# Patient Record
Sex: FEMALE | Race: OTHER | Employment: PART TIME | URBAN - METROPOLITAN AREA
[De-identification: names, ages, dates, MRNs, and addresses within clinical notes are randomized per-mention and may not be internally consistent; named-entity substitution may affect disease eponyms.]

---

## 2017-09-07 ENCOUNTER — OFFICE VISIT (OUTPATIENT)
Dept: FAMILY MEDICINE CLINIC | Facility: CLINIC | Age: 22
End: 2017-09-07

## 2017-09-07 VITALS
DIASTOLIC BLOOD PRESSURE: 88 MMHG | HEART RATE: 106 BPM | WEIGHT: 176 LBS | BODY MASS INDEX: 30.8 KG/M2 | SYSTOLIC BLOOD PRESSURE: 121 MMHG | TEMPERATURE: 98 F | HEIGHT: 63.5 IN

## 2017-09-07 DIAGNOSIS — M79.606 LOW BACK PAIN RADIATING DOWN LEG: ICD-10-CM

## 2017-09-07 DIAGNOSIS — M54.50 LOW BACK PAIN RADIATING DOWN LEG: ICD-10-CM

## 2017-09-07 DIAGNOSIS — M25.561 RIGHT KNEE PAIN, UNSPECIFIED CHRONICITY: Primary | ICD-10-CM

## 2017-09-07 DIAGNOSIS — E66.3 OVERWEIGHT: ICD-10-CM

## 2017-09-07 DIAGNOSIS — Z00.00 ANNUAL PHYSICAL EXAM: ICD-10-CM

## 2017-09-07 LAB
GLUCOSE BLOOD: 83
TEST STRIP LOT #: NORMAL NUMERIC

## 2017-09-07 PROCEDURE — 99203 OFFICE O/P NEW LOW 30 MIN: CPT | Performed by: FAMILY MEDICINE

## 2017-09-07 PROCEDURE — 99212 OFFICE O/P EST SF 10 MIN: CPT | Performed by: FAMILY MEDICINE

## 2017-09-07 PROCEDURE — 36416 COLLJ CAPILLARY BLOOD SPEC: CPT | Performed by: FAMILY MEDICINE

## 2017-09-07 PROCEDURE — 82962 GLUCOSE BLOOD TEST: CPT | Performed by: FAMILY MEDICINE

## 2017-09-07 NOTE — PROGRESS NOTES
HPI:    Patient ID: Jaya Rondon is a 22year old female. Doing well except has recurrent low back pain and right knee pain. Had injury with the soccer years ago. Had pt in the past was helpful.    Also has low back pain          Review of Systems

## 2018-01-05 ENCOUNTER — NURSE TRIAGE (OUTPATIENT)
Dept: OTHER | Age: 23
End: 2018-01-05

## 2018-01-05 NOTE — TELEPHONE ENCOUNTER
Action Requested: Summary for Provider     []  Critical Lab, Recommendations Needed  [] Need Additional Advice  []   FYI    []   Need Orders  [] Need Medications Sent to Pharmacy  []  Other     SUMMARY: Received call from patient who reports for 2 days now

## 2018-07-06 ENCOUNTER — APPOINTMENT (OUTPATIENT)
Dept: GENERAL RADIOLOGY | Facility: HOSPITAL | Age: 23
End: 2018-07-06
Attending: EMERGENCY MEDICINE
Payer: COMMERCIAL

## 2018-07-06 ENCOUNTER — HOSPITAL ENCOUNTER (EMERGENCY)
Facility: HOSPITAL | Age: 23
Discharge: HOME | End: 2018-07-06
Attending: EMERGENCY MEDICINE
Payer: COMMERCIAL

## 2018-07-06 VITALS
DIASTOLIC BLOOD PRESSURE: 84 MMHG | TEMPERATURE: 98 F | OXYGEN SATURATION: 95 % | SYSTOLIC BLOOD PRESSURE: 132 MMHG | HEART RATE: 115 BPM

## 2018-07-06 DIAGNOSIS — S29.012A UPPER BACK STRAIN, INITIAL ENCOUNTER: ICD-10-CM

## 2018-07-06 DIAGNOSIS — S60.211A CONTUSION OF RIGHT WRIST, INITIAL ENCOUNTER: Primary | ICD-10-CM

## 2018-07-06 LAB
B-HCG UR QL: NEGATIVE
BACTERIA UR QL AUTO: NEGATIVE /HPF
BILIRUB UR QL: NEGATIVE
CLARITY UR: CLEAR
COLOR UR: YELLOW
GLUCOSE UR-MCNC: NEGATIVE MG/DL
KETONES UR-MCNC: NEGATIVE MG/DL
NITRITE UR QL STRIP.AUTO: NEGATIVE
PH UR: 7 [PH] (ref 5–8)
PROT UR-MCNC: NEGATIVE MG/DL
RBC #/AREA URNS AUTO: 2 /HPF
SP GR UR STRIP: 1.02 (ref 1–1.03)
UROBILINOGEN UR STRIP-ACNC: <2
VIT C UR-MCNC: NEGATIVE MG/DL
WBC #/AREA URNS AUTO: 1 /HPF

## 2018-07-06 PROCEDURE — 71046 X-RAY EXAM CHEST 2 VIEWS: CPT | Performed by: EMERGENCY MEDICINE

## 2018-07-06 PROCEDURE — 99284 EMERGENCY DEPT VISIT MOD MDM: CPT

## 2018-07-06 PROCEDURE — 81001 URINALYSIS AUTO W/SCOPE: CPT | Performed by: EMERGENCY MEDICINE

## 2018-07-06 PROCEDURE — 81025 URINE PREGNANCY TEST: CPT

## 2018-07-06 PROCEDURE — 73110 X-RAY EXAM OF WRIST: CPT | Performed by: EMERGENCY MEDICINE

## 2018-07-06 RX ORDER — IBUPROFEN 600 MG/1
600 TABLET ORAL ONCE
Status: COMPLETED | OUTPATIENT
Start: 2018-07-06 | End: 2018-07-06

## 2018-07-06 RX ORDER — CYCLOBENZAPRINE HCL 10 MG
10 TABLET ORAL 3 TIMES DAILY PRN
Qty: 15 TABLET | Refills: 0 | Status: SHIPPED | OUTPATIENT
Start: 2018-07-06 | End: 2018-07-11

## 2018-07-06 RX ORDER — NAPROXEN 500 MG/1
500 TABLET ORAL 2 TIMES DAILY PRN
Qty: 14 TABLET | Refills: 0 | Status: SHIPPED | OUTPATIENT
Start: 2018-07-06 | End: 2018-07-13
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
## 2018-08-15 ENCOUNTER — OFFICE VISIT (OUTPATIENT)
Dept: FAMILY MEDICINE CLINIC | Facility: CLINIC | Age: 23
End: 2018-08-15
Payer: COMMERCIAL

## 2018-08-15 VITALS
BODY MASS INDEX: 32 KG/M2 | HEART RATE: 75 BPM | SYSTOLIC BLOOD PRESSURE: 113 MMHG | WEIGHT: 182.5 LBS | DIASTOLIC BLOOD PRESSURE: 75 MMHG

## 2018-08-15 DIAGNOSIS — M54.6 BILATERAL THORACIC BACK PAIN, UNSPECIFIED CHRONICITY: Primary | ICD-10-CM

## 2018-08-15 PROCEDURE — 99212 OFFICE O/P EST SF 10 MIN: CPT | Performed by: FAMILY MEDICINE

## 2018-08-15 PROCEDURE — 99213 OFFICE O/P EST LOW 20 MIN: CPT | Performed by: FAMILY MEDICINE

## 2018-08-15 RX ORDER — NAPROXEN 500 MG/1
500 TABLET ORAL 2 TIMES DAILY WITH MEALS
Qty: 50 TABLET | Refills: 0 | Status: SHIPPED | OUTPATIENT
Start: 2018-08-15 | End: 2018-08-20
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
## 2018-08-20 ENCOUNTER — OFFICE VISIT (OUTPATIENT)
Dept: OBGYN CLINIC | Facility: CLINIC | Age: 23
End: 2018-08-20
Payer: COMMERCIAL

## 2018-08-20 VITALS
HEIGHT: 63 IN | WEIGHT: 181.19 LBS | HEART RATE: 96 BPM | SYSTOLIC BLOOD PRESSURE: 118 MMHG | BODY MASS INDEX: 32.11 KG/M2 | DIASTOLIC BLOOD PRESSURE: 82 MMHG

## 2018-08-20 DIAGNOSIS — Z01.419 ENCOUNTER FOR WELL WOMAN EXAM WITH ROUTINE GYNECOLOGICAL EXAM: Primary | ICD-10-CM

## 2018-08-20 DIAGNOSIS — B96.89 BV (BACTERIAL VAGINOSIS): ICD-10-CM

## 2018-08-20 DIAGNOSIS — Z01.411 ENCOUNTER FOR GYNECOLOGICAL EXAMINATION WITH ABNORMAL FINDING: ICD-10-CM

## 2018-08-20 DIAGNOSIS — N76.0 BV (BACTERIAL VAGINOSIS): ICD-10-CM

## 2018-08-20 DIAGNOSIS — Z11.3 ROUTINE SCREENING FOR STI (SEXUALLY TRANSMITTED INFECTION): ICD-10-CM

## 2018-08-20 DIAGNOSIS — N89.8 VAGINAL DISCHARGE: ICD-10-CM

## 2018-08-20 PROCEDURE — 99385 PREV VISIT NEW AGE 18-39: CPT | Performed by: OBSTETRICS & GYNECOLOGY

## 2018-08-20 RX ORDER — METRONIDAZOLE 7.5 MG/G
1 GEL VAGINAL NIGHTLY
Qty: 1 TUBE | Refills: 0 | Status: SHIPPED | OUTPATIENT
Start: 2018-08-20 | End: 2018-08-25

## 2018-08-21 LAB
C TRACH DNA SPEC QL NAA+PROBE: NEGATIVE
HPV I/H RISK 1 DNA SPEC QL NAA+PROBE: NEGATIVE
N GONORRHOEA DNA SPEC QL NAA+PROBE: NEGATIVE

## 2018-08-22 ENCOUNTER — TELEPHONE (OUTPATIENT)
Dept: OBGYN CLINIC | Facility: CLINIC | Age: 23
End: 2018-08-22

## 2018-08-22 LAB
GENITAL VAGINOSIS SCREEN: POSITIVE
TRICHOMONAS SCREEN: NEGATIVE

## 2019-02-08 ENCOUNTER — TELEPHONE (OUTPATIENT)
Dept: OBGYN CLINIC | Facility: CLINIC | Age: 24
End: 2019-02-08

## 2019-02-08 NOTE — TELEPHONE ENCOUNTER
I last saw the patient last August.  Patient needs to come in for exam and vaginal Culture before just prescribing a medication. Call patient.

## 2019-02-08 NOTE — TELEPHONE ENCOUNTER
Pt. States that the dr prescribed a med. For a yeast infection, which gave the pt. Side effects, so the pt. Wants to know if the dr can prescribe another med. ?

## 2019-02-08 NOTE — TELEPHONE ENCOUNTER
Per pt states that last time she was seen she was prescribed Metrogel for her symptoms. States she had a reaction to the medication and d/c. States symptoms improved but is now noticing a vaginal odor again.  Pt would like to get a different medication that

## 2019-02-09 NOTE — TELEPHONE ENCOUNTER
Pt needs to be seen to be evaluated and have vaginal culture done per Dr. Luis Cannon. Pt was informed and verbalized understanding. Will call back to schedule appt.

## 2019-02-11 ENCOUNTER — OFFICE VISIT (OUTPATIENT)
Dept: OBGYN CLINIC | Facility: CLINIC | Age: 24
End: 2019-02-11
Payer: COMMERCIAL

## 2019-02-11 VITALS — BODY MASS INDEX: 34 KG/M2 | WEIGHT: 191 LBS | DIASTOLIC BLOOD PRESSURE: 60 MMHG | SYSTOLIC BLOOD PRESSURE: 100 MMHG

## 2019-02-11 DIAGNOSIS — N89.8 VAGINAL DISCHARGE: Primary | ICD-10-CM

## 2019-02-11 PROCEDURE — 99213 OFFICE O/P EST LOW 20 MIN: CPT | Performed by: OBSTETRICS & GYNECOLOGY

## 2019-02-11 RX ORDER — METRONIDAZOLE 500 MG/1
500 TABLET ORAL 2 TIMES DAILY
Qty: 14 TABLET | Refills: 0 | Status: SHIPPED | OUTPATIENT
Start: 2019-02-11 | End: 2019-10-18 | Stop reason: ALTCHOICE

## 2019-02-11 NOTE — PROGRESS NOTES
HPI:    Patient ID: Yenny Chang is a 26year old female. Patient reports vaginal discharge with odor for about two weeks now. Very similar to BV episode last August.  Patient reports not finishing Metrogel therapy due to irritation and bleeding.   I s & Referrals:  None       #[id number]

## 2019-02-12 ENCOUNTER — TELEPHONE (OUTPATIENT)
Dept: OBGYN CLINIC | Facility: CLINIC | Age: 24
End: 2019-02-12

## 2019-02-12 NOTE — TELEPHONE ENCOUNTER
LMTCB--      --- Message from Pauly Malcolm MD sent at 2/12/2019  5:41 AM CST -----  Vaginal Culture is + for BV. Patient already given Flagyl. Please notify patient and confirm that she got her prescription and taking meds.

## 2019-02-13 LAB
GENITAL VAGINOSIS SCREEN: POSITIVE
TRICHOMONAS SCREEN: NEGATIVE

## 2019-02-15 NOTE — TELEPHONE ENCOUNTER
Informed pt of message below--    -- Message from Colt Mann MD sent at 2/12/2019  5:41 AM CST -----  Vaginal Culture is + for BV. Patient already given Flagyl.   Please notify patient and confirm that she got her prescription and taking meds.

## 2019-09-16 ENCOUNTER — HOSPITAL ENCOUNTER (EMERGENCY)
Facility: HOSPITAL | Age: 24
Discharge: HOME | End: 2019-09-17
Attending: EMERGENCY MEDICINE
Payer: COMMERCIAL

## 2019-09-16 VITALS
TEMPERATURE: 98 F | RESPIRATION RATE: 18 BRPM | HEART RATE: 90 BPM | SYSTOLIC BLOOD PRESSURE: 124 MMHG | HEIGHT: 62 IN | WEIGHT: 180 LBS | DIASTOLIC BLOOD PRESSURE: 83 MMHG | BODY MASS INDEX: 33.13 KG/M2 | OXYGEN SATURATION: 98 %
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
## 2019-09-16 DIAGNOSIS — S23.9XXA SPRAIN OF THORACIC REGION: Primary | ICD-10-CM

## 2019-09-16 PROCEDURE — 99283 EMERGENCY DEPT VISIT LOW MDM: CPT

## 2019-09-16 RX ORDER — HYDROCODONE BITARTRATE AND ACETAMINOPHEN 5; 325 MG/1; MG/1
1 TABLET ORAL ONCE
Status: COMPLETED | OUTPATIENT
Start: 2019-09-16 | End: 2019-09-16

## 2019-09-16 RX ORDER — HYDROCODONE BITARTRATE AND ACETAMINOPHEN 5; 325 MG/1; MG/1
1 TABLET ORAL EVERY 6 HOURS PRN
Qty: 8 TABLET | Refills: 0 | Status: SHIPPED | OUTPATIENT
Start: 2019-09-16 | End: 2019-10-18 | Stop reason: ALTCHOICE

## 2019-09-16 RX ORDER — IBUPROFEN 600 MG/1
600 TABLET ORAL EVERY 8 HOURS PRN
Qty: 30 TABLET | Refills: 0 | Status: SHIPPED | OUTPATIENT
Start: 2019-09-16 | End: 2019-10-18 | Stop reason: ALTCHOICE

## 2019-09-16 RX ORDER — DIAZEPAM 5 MG/1
5 TABLET ORAL NIGHTLY PRN
Qty: 10 TABLET | Refills: 0 | Status: SHIPPED | OUTPATIENT
Start: 2019-09-16 | End: 2019-10-18 | Stop reason: ALTCHOICE

## 2019-09-16 RX ORDER — DIAZEPAM 5 MG/1
5 TABLET ORAL ONCE
Status: COMPLETED | OUTPATIENT
Start: 2019-09-16 | End: 2019-09-16
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
## 2019-09-20 ENCOUNTER — HOSPITAL ENCOUNTER (OUTPATIENT)
Dept: GENERAL RADIOLOGY | Age: 24
Discharge: HOME | End: 2019-09-20
Attending: NURSE PRACTITIONER
Payer: COMMERCIAL

## 2019-09-20 DIAGNOSIS — S23.9XXA THORACIC SPRAIN: ICD-10-CM

## 2019-09-20 DIAGNOSIS — S13.9XXA CERVICAL SPRAIN, INITIAL ENCOUNTER: ICD-10-CM

## 2019-09-20 PROCEDURE — 72040 X-RAY EXAM NECK SPINE 2-3 VW: CPT | Performed by: NURSE PRACTITIONER

## 2019-09-20 PROCEDURE — 72072 X-RAY EXAM THORAC SPINE 3VWS: CPT | Performed by: NURSE PRACTITIONER

## 2020-09-04 ENCOUNTER — TELEPHONE (OUTPATIENT)
Dept: OBGYN CLINIC | Facility: CLINIC | Age: 25
End: 2020-09-04

## 2020-09-04 NOTE — TELEPHONE ENCOUNTER
Iris pt last seen 02/11/19 by NASRA reported this is first time she gets a period 2 weeks apart. On   Wednesday felt abdominal pain followed by heavy bleeding.  Was saturating pads every 2 hours and passing clots, but today bleedinghas tapperd off to changing

## 2020-09-04 NOTE — TELEPHONE ENCOUNTER
Pt. States that she had her period about 2 weeks ago, and this past Wed. Pt. [place] that she had sharp pains in her stomach and period started all over again. Pt. Is not sure why she is getting her period so soon and It is very heavy?

## 2020-10-10 ENCOUNTER — APPOINTMENT (OUTPATIENT)
Dept: GENERAL RADIOLOGY | Facility: HOSPITAL | Age: 25
End: 2020-10-10
Attending: EMERGENCY MEDICINE
Payer: COMMERCIAL

## 2020-10-10 ENCOUNTER — HOSPITAL ENCOUNTER (EMERGENCY)
Facility: HOSPITAL | Age: 25
Discharge: HOME | End: 2020-10-10
Attending: EMERGENCY MEDICINE
Payer: COMMERCIAL

## 2020-10-10 VITALS
RESPIRATION RATE: 18 BRPM | HEART RATE: 95 BPM | SYSTOLIC BLOOD PRESSURE: 124 MMHG | BODY MASS INDEX: 33.13 KG/M2 | HEIGHT: 62 IN | DIASTOLIC BLOOD PRESSURE: 85 MMHG | TEMPERATURE: 98 F | OXYGEN SATURATION: 99 % | WEIGHT: 180 LBS

## 2020-10-10 DIAGNOSIS — S89.90XA KNEE INJURY, UNSPECIFIED LATERALITY, INITIAL ENCOUNTER: Primary | ICD-10-CM

## 2020-10-10 PROCEDURE — 73560 X-RAY EXAM OF KNEE 1 OR 2: CPT | Performed by: EMERGENCY MEDICINE

## 2020-10-10 PROCEDURE — 99284 EMERGENCY DEPT VISIT MOD MDM: CPT

## 2020-10-10 RX ORDER — MELOXICAM 7.5 MG/1
7.5 TABLET ORAL DAILY
Qty: 14 TABLET | Refills: 0 | Status: SHIPPED | OUTPATIENT
Start: 2020-10-10 | End: 2020-10-24

## 2020-10-10 RX ORDER — TRAMADOL HYDROCHLORIDE 50 MG/1
50 TABLET ORAL EVERY 12 HOURS PRN
Qty: 6 TABLET | Refills: 0 | Status: SHIPPED | OUTPATIENT
Start: 2020-10-10 | End: 2020-10-13
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
## 2020-10-11 NOTE — ED NOTES
At time of discharge, Mandi Hudson is aaox4, speech clear, respirations regular and nonlabored. She is assisted to wheelchair for ED exit.  Ami verbalizes understanding of discharge instructions including prescribed medications and follow up recommendation
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
## 2020-10-14 ENCOUNTER — APPOINTMENT (OUTPATIENT)
Dept: CT IMAGING | Facility: HOSPITAL | Age: 25
End: 2020-10-14
Attending: NURSE PRACTITIONER
Payer: COMMERCIAL

## 2020-10-14 ENCOUNTER — HOSPITAL ENCOUNTER (EMERGENCY)
Facility: HOSPITAL | Age: 25
Discharge: HOME | End: 2020-10-14
Payer: COMMERCIAL

## 2020-10-14 VITALS
SYSTOLIC BLOOD PRESSURE: 111 MMHG | DIASTOLIC BLOOD PRESSURE: 80 MMHG | HEART RATE: 90 BPM | WEIGHT: 180 LBS | OXYGEN SATURATION: 99 % | RESPIRATION RATE: 18 BRPM | HEIGHT: 62 IN | TEMPERATURE: 98 F | BODY MASS INDEX: 33.13 KG/M2

## 2020-10-14 DIAGNOSIS — M25.561 ACUTE BILATERAL KNEE PAIN: Primary | ICD-10-CM

## 2020-10-14 DIAGNOSIS — M25.562 ACUTE BILATERAL KNEE PAIN: Primary | ICD-10-CM

## 2020-10-14 PROCEDURE — 73700 CT LOWER EXTREMITY W/O DYE: CPT | Performed by: NURSE PRACTITIONER

## 2020-10-14 PROCEDURE — 99284 EMERGENCY DEPT VISIT MOD MDM: CPT

## 2020-10-14 PROCEDURE — 81025 URINE PREGNANCY TEST: CPT

## 2020-10-14 RX ORDER — HYDROCODONE BITARTRATE AND ACETAMINOPHEN 5; 325 MG/1; MG/1
1 TABLET ORAL ONCE
Status: COMPLETED | OUTPATIENT
Start: 2020-10-14 | End: 2020-10-14

## 2020-10-14 RX ORDER — ACETAMINOPHEN AND CODEINE PHOSPHATE 300; 30 MG/1; MG/1
1-2 TABLET ORAL EVERY 6 HOURS PRN
Qty: 10 TABLET | Refills: 0 | Status: SHIPPED | OUTPATIENT
Start: 2020-10-14 | End: 2020-10-16

## 2020-10-14 NOTE — ED PROVIDER NOTES
Patient Seen in: [facility] AND [facility] Emergency Department      History   Patient presents with:  Knee Pain  Leg Pain    Stated Complaint: knee pain    26yo/f with no chronic medical problems reports to the ED with complaints of worsening bilateral knee cade Breath sounds: Normal breath sounds. Abdominal:      General: Bowel sounds are normal.      Palpations: Abdomen is soft. Musculoskeletal: Normal range of motion. General: Swelling and tenderness present. No deformity.       Comments: Neg homans, recommended.            Dictated by (CST): Ryan Castro MD on 10/14/2020 at 8:15 PM       Finalized by (CST): Ryan Castro MD on 10/14/2020 at 8:20 PM                 TECHNIQUE:    Multi-planar CT images of the left knee were obtained without intravenou for possible acl tear left side  Otherwise no acute injury  Compartments soft  No ecchymosis    Plan  Outpatient mri, close fu with ortho/Dr. Yumiko Gilman patient on home care, ss of worsening condition, reasons for immediate re-eval, importance of c
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
## 2020-10-15 ENCOUNTER — HOSPITAL ENCOUNTER (OUTPATIENT)
Dept: MRI IMAGING | Facility: HOSPITAL | Age: 25
Discharge: HOME | End: 2020-10-15
Attending: NURSE PRACTITIONER
Payer: COMMERCIAL

## 2020-10-15 DIAGNOSIS — S89.92XA KNEE INJURY, LEFT, INITIAL ENCOUNTER: ICD-10-CM

## 2020-10-15 DIAGNOSIS — M25.562 LEFT KNEE PAIN, UNSPECIFIED CHRONICITY: ICD-10-CM

## 2020-10-15 PROCEDURE — 73721 MRI JNT OF LWR EXTRE W/O DYE: CPT | Performed by: NURSE PRACTITIONER

## 2020-11-19 NOTE — PROGRESS NOTES
HPI:    Patient ID: Ankit Hurtado is a 25year old female. HPI  Patient here for routine exam.  New to office. Reports vaginal discharge with odor. Patient is sexually active and admits to having intercourse without condom use at least once.   Discusse Open Wound Care     for ___ARM___________        ? No strenuous activity for 48 hours    ? Take Tylenol as needed for discomfort.                                                .         ? Do not drink alcoholic beverages for 48 hours.    ? Keep the pressure bandage in place for 24 hours. If the bandage becomes blood tinged or loose, reinforce it with gauze and tape.        (Refer to the reverse side of this page for management of bleeding).    ? Remove bandage in 24 hours and begin wound care as follows:     1. Clean area with tap water using a Q tip or gauze pad, (shower / bathe normally)  2. Dry wound with Q tip or gauze pad  3. Apply Aquaphor, Vaseline, Polysporin or Bacitracin Ointment with a Q tip    Do NOT use Neosporin Ointment *  4. Cover the wound with a band-aid or nonstick gauze pad and paper tape.  5. Repeat wound care once a day until wound is completely healed.    It is an old wives tale that a wound heals better when it is exposed to air and allowed to dry out. The wound will heal faster with a better cosmetic result if it is kept moist with ointment and covered with a bandage.  Do not let the wound dry out.      Supplies Needed:                Qtips or gauze pads                Polysporin or Bacitracin Ointment                Bandaids or nonstick gauze pads and paper tape    Wound care kits and brown paper tape are available for purchase at   the pharmacy.    BLEEDIN. Use tightly rolled up gauze or cloth to apply direct pressure over the bandage for 20   minutes.  2. Reapply pressure for an additional 20 minutes if necessary  3. Call the office or go to the nearest emergency room if pressure fails to stop the bleeding.  4. Use additional gauze and tape to maintain pressure once the bleeding has stopped.  5. Begin wound care 24 hours after surgery as directed.                  WOUND HEALING    1. One week after surgery a pink / red halo will form around the outside of the wound.   This is new  reviewed.              ASSESSMENT/PLAN:   Encounter for well woman exam with routine gynecological exam  (primary encounter diagnosis)  Routine screening for sti (sexually transmitted infection)  Vaginal discharge  Encounter for gynecological examination wi skin.  2. The center of the wound will appear yellowish white and produce some drainage.  3. The pink halo will slowly migrate in toward the center of the wound until the wound is covered with new shiny pink skin.  4. There will be no more drainage when the wound is completely healed.  5. It will take six months to one year for the redness to fade.  6. The scar may be itchy, tight and sensitive to extreme temperatures for a year after the surgery.  7. Massaging the area several times a day for several minutes after the wound is completely healed will help the scar soften and normalize faster. Begin massage only after healing is complete.      In case of emergency call: Dr Dutta: [phone]     [facility]: [phone]    [facility]: [phone]

## 2021-04-09 ENCOUNTER — HOSPITAL ENCOUNTER (OUTPATIENT)
Age: 26
Discharge: HOME | End: 2021-04-09
Payer: COMMERCIAL

## 2021-04-09 VITALS
OXYGEN SATURATION: 100 % | HEART RATE: 96 BPM | HEIGHT: 62 IN | BODY MASS INDEX: 31.28 KG/M2 | TEMPERATURE: 98 F | RESPIRATION RATE: 16 BRPM | SYSTOLIC BLOOD PRESSURE: 115 MMHG | DIASTOLIC BLOOD PRESSURE: 60 MMHG | WEIGHT: 170 LBS

## 2021-04-09 DIAGNOSIS — R19.7 DIARRHEA, UNSPECIFIED TYPE: Primary | ICD-10-CM

## 2021-04-09 PROCEDURE — 99203 OFFICE O/P NEW LOW 30 MIN: CPT | Performed by: NURSE PRACTITIONER

## 2021-04-09 PROCEDURE — 81025 URINE PREGNANCY TEST: CPT | Performed by: NURSE PRACTITIONER

## 2021-04-09 PROCEDURE — 81002 URINALYSIS NONAUTO W/O SCOPE: CPT | Performed by: NURSE PRACTITIONER

## 2021-04-09 PROCEDURE — 80047 BASIC METABLC PNL IONIZED CA: CPT | Performed by: NURSE PRACTITIONER

## 2021-04-09 PROCEDURE — 36415 COLL VENOUS BLD VENIPUNCTURE: CPT | Performed by: NURSE PRACTITIONER

## 2021-04-09 PROCEDURE — 85025 COMPLETE CBC W/AUTO DIFF WBC: CPT | Performed by: NURSE PRACTITIONER

## 2021-04-09 RX ORDER — DICYCLOMINE HCL 20 MG
20 TABLET ORAL 4 TIMES DAILY PRN
Qty: 20 TABLET | Refills: 0 | Status: SHIPPED | OUTPATIENT
Start: 2021-04-09 | End: 2021-08-26 | Stop reason: ALTCHOICE

## 2021-04-09 RX ORDER — IBUPROFEN 200 MG
200 TABLET ORAL EVERY 6 HOURS PRN
COMMUNITY

## 2021-04-09 RX ORDER — ONDANSETRON 4 MG/1
4 TABLET, ORALLY DISINTEGRATING ORAL EVERY 4 HOURS PRN
Qty: 10 TABLET | Refills: 0 | Status: SHIPPED | OUTPATIENT
Start: 2021-04-09 | End: 2021-04-16

## 2021-04-10 NOTE — ED INITIAL ASSESSMENT (HPI)
Pt c/o generalized abd pain and soft stool x5 days. States diarrhea x1 day, first day of symptoms. No N/V. States 8 episodes of soft stool in past 24 hrs. No blood in stool. No fever. No urinary symptoms.

## 2021-04-10 NOTE — ED PROVIDER NOTES
Patient Seen in: [facility]      History   Patient presents with:  Diarrhea: Upset stomach for 4 days. - Entered by patient  Abdominal Pain    Stated Complaint: Diarrhea - Upset stomach for 4 days.     HPI/Subjective:   HPI    26year-old fem distress. Appearance: She is well-developed. She is obese. She is not ill-appearing. HENT:      Head: Normocephalic. Eyes:      Conjunctiva/sclera: Conjunctivae normal.   Cardiovascular:      Rate and Rhythm: Normal rate and regular rhythm.       He that if pain localizes or does not improve that she will need to be seen in the ER for further evaluation and scan           Clinical impression as well as any lab results and radiology findings were discussed with the patient.  Patient is advised to follow

## 2021-08-26 PROBLEM — S83.512A RUPTURE OF ANTERIOR CRUCIATE LIGAMENT OF BOTH KNEES: Status: ACTIVE | Noted: 2021-08-26

## 2021-08-26 PROBLEM — S83.209A ACUTE TORN MENISCUS: Status: ACTIVE | Noted: 2021-08-26

## 2021-08-26 PROBLEM — S83.511A RUPTURE OF ANTERIOR CRUCIATE LIGAMENT OF BOTH KNEES: Status: ACTIVE | Noted: 2021-08-26

## 2021-09-29 ENCOUNTER — OFFICE VISIT (OUTPATIENT)
Dept: FAMILY MEDICINE CLINIC | Facility: CLINIC | Age: 26
End: 2021-09-29
Payer: COMMERCIAL

## 2021-09-29 VITALS
WEIGHT: 189 LBS | HEART RATE: 88 BPM | HEIGHT: 62 IN | OXYGEN SATURATION: 98 % | DIASTOLIC BLOOD PRESSURE: 75 MMHG | SYSTOLIC BLOOD PRESSURE: 129 MMHG | TEMPERATURE: 98 F | RESPIRATION RATE: 18 BRPM | BODY MASS INDEX: 34.78 KG/M2

## 2021-09-29 DIAGNOSIS — Z20.822 ENCOUNTER FOR SCREENING LABORATORY TESTING FOR COVID-19 VIRUS IN ASYMPTOMATIC PATIENT: Primary | ICD-10-CM

## 2021-09-29 PROCEDURE — 3008F BODY MASS INDEX DOCD: CPT | Performed by: NURSE PRACTITIONER

## 2021-09-29 PROCEDURE — 3078F DIAST BP <80 MM HG: CPT | Performed by: NURSE PRACTITIONER

## 2021-09-29 PROCEDURE — 3074F SYST BP LT 130 MM HG: CPT | Performed by: NURSE PRACTITIONER

## 2021-09-29 PROCEDURE — 99212 OFFICE O/P EST SF 10 MIN: CPT | Performed by: NURSE PRACTITIONER

## 2021-09-29 NOTE — PROGRESS NOTES
CHIEF COMPLAINT:   Patient presents with:  Wellness Visit: Need covid test for work - Entered by patient      HPI:   Sarah Hung is a 26year old female who presents for Covid 19 test. No exposure. Needs for work. .  At this time, not experiencing upper r bulging, no retraction, no fluid, bony landmarks visualized  NOSE: Nostrils patent, no nasal discharge, nasal mucosa pink  THROAT: Oral mucosa pink, moist. Posterior pharynx is not erythematous. no exudates. Tonsils 0/4.     NECK: Supple, non-tender  LUNGS: you get the 2-dose vaccine, the second dose is given several weeks after the first. You are considered fully vaccinated 2 weeks after getting the 1-dose or the second shot of the 2-dose vaccine.   During a pandemic, it's especially important to keep up on r water, use an alcohol-based hand  often. Make sure it has at least 60% alcohol. · Don't touch your eyes, nose, or mouth unless you have clean hands. · Don’t kiss someone who is sick. · If you need to cough or sneeze, do it into a tissue.  Then t throw the tissue into the trash. If you don't have tissues, cough or sneeze into the bend of your elbow. · Don't attend public gatherings if possible. If you do attend public gatherings, follow social distancing rules.  Don't share food or personal items s in any way. · Tell your supervisor if you are well but live with someone who has COVID-19. · Stay at least 6 feet away from all people. · Don't shake hands with anyone. · Don't attend in-person meetings, or limit how many you attend.  Meet over phone or it:   · Call your healthcare provider and follow all instructions. Stay home away from others and monitor your health. This is called quarantine.  The CDC advises that you quarantine to help prevent the spread of COVID-19 once you've been exposed to someone fever, cough, and trouble breathing. They may also include body aches, headache, chills or repeated shaking with chills, sore throat, loss of taste or smell, or diarrhea. Don’t go to a healthcare facility before speaking to a healthcare provider.   Last mod

## 2021-09-30 LAB — SARS-COV-2 RNA RESP QL NAA+PROBE: NOT DETECTED

## 2021-10-07 ENCOUNTER — OFFICE VISIT (OUTPATIENT)
Dept: FAMILY MEDICINE CLINIC | Facility: CLINIC | Age: 26
End: 2021-10-07
Payer: COMMERCIAL

## 2021-10-07 VITALS
HEART RATE: 94 BPM | OXYGEN SATURATION: 98 % | RESPIRATION RATE: 16 BRPM | HEIGHT: 62 IN | DIASTOLIC BLOOD PRESSURE: 75 MMHG | BODY MASS INDEX: 34.78 KG/M2 | WEIGHT: 189 LBS | SYSTOLIC BLOOD PRESSURE: 102 MMHG | TEMPERATURE: 98 F

## 2021-10-07 DIAGNOSIS — Z11.52 ENCOUNTER FOR SCREENING FOR COVID-19: Primary | ICD-10-CM

## 2021-10-07 PROCEDURE — 99212 OFFICE O/P EST SF 10 MIN: CPT | Performed by: NURSE PRACTITIONER

## 2021-10-07 PROCEDURE — 3078F DIAST BP <80 MM HG: CPT | Performed by: NURSE PRACTITIONER

## 2021-10-07 PROCEDURE — 3074F SYST BP LT 130 MM HG: CPT | Performed by: NURSE PRACTITIONER

## 2021-10-07 PROCEDURE — 3008F BODY MASS INDEX DOCD: CPT | Performed by: NURSE PRACTITIONER

## 2021-10-07 NOTE — PROGRESS NOTES
CHIEF COMPLAINT:   Patient presents with:  Covid-19 Test      HPI:   Bhavesh Martínez is a 26year old female who presents for Covid 19 testing. Denies any symptoms or exposure but her job is requiring it weekly due to her not being COVID-vaccinated.   At [facility] PLAN:   Leonoar Kwok is a 26year old female who presents with     ASSESSMENT: Encounter for screening for covid-19  (primary encounter diagnosis)    PLAN:   RT PCR - [employer] ordered, needs for work requirement    Continue COVID-preventative measures.    who have long-term (chronic) health conditions. Getting a yearly flu vaccine is advised for everyone 7 months old and older, with rare exceptions. Health experts advise the flu vaccine to protect you and others.  The flu vaccine helps protect those at high- and handles, cabinet handles, and light switches. · Clean frequently-touched home surfaces often with disinfectant. This includes desk surfaces, printers, phones, kitchen counters, tables, fridge door handle, bathroom surfaces, and any soiled surface.  Isis disposable paper mask with a cloth mask over it. You can make a cloth face mask of your own. The [facility] has instructions on how to make a mask. Wear the mask so that it covers both your nose and mouth. · Be aware of your community's mask requirements.  Debora Johnson seconds. · If you don't have access to soap and water, use an alcohol-based hand  often. Make sure it has at least 60% alcohol. · Don't touch your eyes, nose, or mouth unless you have clean hands.   · Wear a face mask as advised by your employer, for how long quarantine should last. If you have been exposed but don't have symptoms, you can stop quarantine:  ? 10 days after exposure if you don't get a diagnostic (viral) test, or  ? 7 days after getting a negative viral test result.     · Take your t a substitute for professional medical care. Always follow your healthcare professional's instructions. The patient indicates understanding of these issues and agrees to the plan.

## 2021-10-13 ENCOUNTER — OFFICE VISIT (OUTPATIENT)
Dept: FAMILY MEDICINE CLINIC | Facility: CLINIC | Age: 26
End: 2021-10-13
Payer: COMMERCIAL

## 2021-10-13 DIAGNOSIS — Z11.59 SCREENING FOR VIRAL DISEASE: Primary | ICD-10-CM

## 2021-10-13 PROCEDURE — 99212 OFFICE O/P EST SF 10 MIN: CPT | Performed by: PHYSICIAN ASSISTANT

## 2021-10-13 PROCEDURE — 3008F BODY MASS INDEX DOCD: CPT | Performed by: PHYSICIAN ASSISTANT

## 2021-10-14 VITALS
HEIGHT: 62 IN | HEART RATE: 77 BPM | RESPIRATION RATE: 16 BRPM | TEMPERATURE: 98 F | BODY MASS INDEX: 33.13 KG/M2 | WEIGHT: 180 LBS | OXYGEN SATURATION: 97 %

## 2021-10-15 NOTE — PROGRESS NOTES
CHIEF COMPLAINT:   Patient presents with:  Covid-19 Test      HPI:   Leonora Kwok is a 23year old female who presents for Covid testing. Patient needs COVID testing for work.   Patient denies any known exposure to COVID,  Patient denies any symptoms of C retraction, no fluid, bony landmarks intact. EACs WNL BL. NOSE: Nostrils patent, no nasal discharge, nasal mucosa pink   THROAT: oral mucosa pink, moist. Posterior pharynx not erythematous or injected. No exudates.   No uvular deviation, drooling, muffl recommendations. If you test positive for COVID-19, you should notify your family and friends with whom you have had close contact recently (starting 2 days before you first had symptoms). What counts as close contact?     * You were within 6 feet of s places. If you must go out, avoid using any kind of public transportation, ridesharing, or taxis. 2. Monitor your symptoms carefully. If your symptoms get worse, call your healthcare provider immediately. 3. Get rest and stay hydrated.    4. If you have following the below guidelines:  • At least 24 hours have passed since recovery defined as resolution of fever without the use of fever-reducing medications; and  · Improvement in respiratory symptoms (e.g., cough, shortness of breath); and  · At least 10 donate convalescent plasma? The process for donating plasma is very similar to donating blood. [employer] (a large blood research institute in [address] [facility] and one of [facility]’s blood product suppliers) is coordinating plasma donations.     If you wou following symptoms:    Persistent severe fatigue Brain fog or trouble concentrating   Headaches Sleeping difficulties   Anxiety or depression Loss of taste or smell   Chest pain  Palpitations Light headedness  Muscle Pain   Increased Heart Rate Tingling, n

## 2021-10-15 NOTE — PATIENT INSTRUCTIONS
Coronavirus Disease 2019 (COVID-19)     [facility] is committed to the safety and well-being of our patients, members, employees, and communities.  As concerns arise about the new strain of coronavirus that causes COVID-19, [facility] exposure  • After day 7 from date of last exposure with a negative test result (test must occur on day 5 or later)  After stopping quarantine, you should  • Watch for symptoms until 14 days after exposure.   • If you have symptoms, immediately self-isolate Care     If you are awaiting test results or are confirmed positive for COVID -19, and your symptoms worsen at home with symptoms such as: extreme weakness, difficult breathing, or unrelenting fevers greater than 100.4 degrees Fahrenheit, you should contac Follow-up  If you are diagnosed with COVID, refrain from exercise until approved by your primary care provider. Please call your primary care provider within 2 days of your discharge to arrange for a telehealth follow-up.  CDC does not recommend repeat test Control & Prevention (CDC)  10 things you can do to manage your health at home, [email or web address]. pdf  [email or web address] Retrieved March 17, 2021, from [email or web address]/covid-19-information/covid-19-long-haulers. html  Long-term effects of covid-19. (n.d.).  Retrieved May 11, 2021, from [email or web address]

## 2021-10-21 ENCOUNTER — OFFICE VISIT (OUTPATIENT)
Dept: FAMILY MEDICINE CLINIC | Facility: CLINIC | Age: 26
End: 2021-10-21
Payer: COMMERCIAL

## 2021-10-21 VITALS
DIASTOLIC BLOOD PRESSURE: 79 MMHG | HEART RATE: 81 BPM | SYSTOLIC BLOOD PRESSURE: 137 MMHG | BODY MASS INDEX: 33.13 KG/M2 | HEIGHT: 62 IN | WEIGHT: 180 LBS | TEMPERATURE: 99 F | OXYGEN SATURATION: 100 % | RESPIRATION RATE: 15 BRPM

## 2021-10-21 DIAGNOSIS — Z11.52 ENCOUNTER FOR SCREENING FOR COVID-19: Primary | ICD-10-CM

## 2021-10-21 PROCEDURE — 99212 OFFICE O/P EST SF 10 MIN: CPT | Performed by: NURSE PRACTITIONER

## 2021-10-21 PROCEDURE — 3008F BODY MASS INDEX DOCD: CPT | Performed by: NURSE PRACTITIONER

## 2021-10-21 PROCEDURE — 3078F DIAST BP <80 MM HG: CPT | Performed by: NURSE PRACTITIONER

## 2021-10-21 PROCEDURE — 3075F SYST BP GE 130 - 139MM HG: CPT | Performed by: NURSE PRACTITIONER

## 2021-10-22 NOTE — PROGRESS NOTES
CHIEF COMPLAINT:   Patient presents with:  Covid-19 Test      HPI:   Ankit Hurtado is a 23year old female who presents for Covid 19 testing. Denies any symptoms or exposure but her job is requiring it weekly due to her not being COVID-vaccinated.   Denies Breathing is non labored. Cough- absent.   CARDIO: RRR without murmur    ASSESSMENT AND PLAN:   Ankit Hurtado is a 23year old female who presents with     ASSESSMENT: Encounter for screening for covid-19  (primary encounter diagnosis)    PLAN:   RT PCR - adults and those who have long-term (chronic) health conditions. Getting a yearly flu vaccine is advised for everyone 7 months old and older, with rare exceptions. Health experts advise the flu vaccine to protect you and others.  The flu vaccine helps prote such as doorknobs and handles, cabinet handles, and light switches. · Clean frequently-touched home surfaces often with disinfectant.  This includes desk surfaces, printers, phones, kitchen counters, tables, fridge door handle, bathroom surfaces, and any s you can wear a disposable paper mask with a cloth mask over it. You can make a cloth face mask of your own. The [facility] has instructions on how to make a mask. Wear the mask so that it covers both your nose and mouth.   · Be aware of your community's mask requi at least 20 seconds. · If you don't have access to soap and water, use an alcohol-based hand  often. Make sure it has at least 60% alcohol. · Don't touch your eyes, nose, or mouth unless you have clean hands.   · Wear a face mask as advised by yo two options for how long quarantine should last. If you have been exposed but don't have symptoms, you can stop quarantine:  ? 10 days after exposure if you don't get a diagnostic (viral) test, or  ? 7 days after getting a negative viral test result.     · intended as a substitute for professional medical care. Always follow your healthcare professional's instructions. The patient indicates understanding of these issues and agrees to the plan.

## 2021-10-28 ENCOUNTER — OFFICE VISIT (OUTPATIENT)
Dept: FAMILY MEDICINE CLINIC | Facility: CLINIC | Age: 26
End: 2021-10-28
Payer: COMMERCIAL

## 2021-10-28 VITALS
HEART RATE: 91 BPM | BODY MASS INDEX: 34.04 KG/M2 | HEIGHT: 62 IN | TEMPERATURE: 99 F | DIASTOLIC BLOOD PRESSURE: 79 MMHG | RESPIRATION RATE: 16 BRPM | OXYGEN SATURATION: 97 % | SYSTOLIC BLOOD PRESSURE: 135 MMHG | WEIGHT: 185 LBS

## 2021-10-28 DIAGNOSIS — Z11.52 ENCOUNTER FOR SCREENING FOR COVID-19: Primary | ICD-10-CM

## 2021-10-28 PROCEDURE — 99212 OFFICE O/P EST SF 10 MIN: CPT | Performed by: PHYSICIAN ASSISTANT

## 2021-10-28 PROCEDURE — 3075F SYST BP GE 130 - 139MM HG: CPT | Performed by: PHYSICIAN ASSISTANT

## 2021-10-28 PROCEDURE — 3008F BODY MASS INDEX DOCD: CPT | Performed by: PHYSICIAN ASSISTANT

## 2021-10-28 PROCEDURE — 3078F DIAST BP <80 MM HG: CPT | Performed by: PHYSICIAN ASSISTANT

## 2021-10-28 NOTE — PROGRESS NOTES
CHIEF COMPLAINT:   Patient presents with:  Covid-19 Test: weekly covid test for work      HPI:   Francisco Javier Carr is a 23year old female who presents for COVID 19 testing. Patient is requesting testing for weekly requirement of employer.   At this time, not e labored. CARDIO: RRR without murmur  LYMPH:  No cervical lymphadenopathy. NEURO: No focal deficits     ASSESSMENT AND PLAN:   Dejon Ferrer is a 23year old female who presents with for COVID-19 testing.     ASSESSMENT: Encounter for screening for covid

## 2021-11-04 ENCOUNTER — OFFICE VISIT (OUTPATIENT)
Dept: FAMILY MEDICINE CLINIC | Facility: CLINIC | Age: 26
End: 2021-11-04
Payer: COMMERCIAL

## 2021-11-04 VITALS
SYSTOLIC BLOOD PRESSURE: 137 MMHG | TEMPERATURE: 100 F | DIASTOLIC BLOOD PRESSURE: 81 MMHG | HEART RATE: 91 BPM | BODY MASS INDEX: 34.96 KG/M2 | WEIGHT: 190 LBS | RESPIRATION RATE: 16 BRPM | OXYGEN SATURATION: 98 % | HEIGHT: 62 IN

## 2021-11-04 DIAGNOSIS — Z11.52 ENCOUNTER FOR SCREENING FOR COVID-19: Primary | ICD-10-CM

## 2021-11-04 PROCEDURE — 3008F BODY MASS INDEX DOCD: CPT | Performed by: NURSE PRACTITIONER

## 2021-11-04 PROCEDURE — 99212 OFFICE O/P EST SF 10 MIN: CPT | Performed by: NURSE PRACTITIONER

## 2021-11-04 PROCEDURE — 3079F DIAST BP 80-89 MM HG: CPT | Performed by: NURSE PRACTITIONER

## 2021-11-04 PROCEDURE — 3075F SYST BP GE 130 - 139MM HG: CPT | Performed by: NURSE PRACTITIONER

## 2021-11-04 NOTE — PROGRESS NOTES
CHIEF COMPLAINT:   Patient presents with:  Covid-19 Test      HPI:   Lupe Panchal is a 23year old female who presents for Covid 19 test for work. Not currently vaccinated. .  At this time, not experiencing upper respiratory symptoms, fever, or gastrointes edema  LYMPH:  no lymphadenopathy.     NEURO: No focal deficits       ASSESSMENT AND PLAN:   Myra Martines is a 23year old female who presents with     ASSESSMENT: Encounter for screening for covid-19  (primary encounter diagnosis)    PLAN:   OTC Tylenol a

## 2021-11-04 NOTE — PATIENT INSTRUCTIONS
Coronavirus Disease 2019 (COVID-19)     [place] [address] is committed to the safety and well-being of our patients, members, employees, and communities.  As concerns arise about the new strain of coronavirus that causes COVID-19, [place] [address] exposure  • After day 7 from date of last exposure with a negative test result (test must occur on day 5 or later)  After stopping quarantine, you should  • Watch for symptoms until 14 days after exposure.   • If you have symptoms, immediately self-isolate Care     If you are awaiting test results or are confirmed positive for COVID -19, and your symptoms worsen at home with symptoms such as: extreme weakness, difficult breathing, or unrelenting fevers greater than 100.4 degrees Fahrenheit, you should contac Follow-up  If you are diagnosed with COVID, refrain from exercise until approved by your primary care provider. Please call your primary care provider within 2 days of your discharge to arrange for a telehealth follow-up.  CDC does not recommend repeat test Control & Prevention (CDC)  10 things you can do to manage your health at home, [email or web address]. pdf  [email or web address] Retrieved March 17, 2021, from [email or web address]-[email or web address]-long-haulers. html  Long-term effects of covid-19. (n.d.).  Retrieved May 11, 2021, from [email or web address]

## 2021-11-23 ENCOUNTER — OFFICE VISIT (OUTPATIENT)
Dept: FAMILY MEDICINE CLINIC | Facility: CLINIC | Age: 26
End: 2021-11-23
Payer: COMMERCIAL

## 2021-11-23 VITALS
RESPIRATION RATE: 16 BRPM | SYSTOLIC BLOOD PRESSURE: 138 MMHG | DIASTOLIC BLOOD PRESSURE: 83 MMHG | TEMPERATURE: 98 F | BODY MASS INDEX: 34.04 KG/M2 | HEIGHT: 62 IN | WEIGHT: 185 LBS | HEART RATE: 89 BPM | OXYGEN SATURATION: 100 %

## 2021-11-23 DIAGNOSIS — Z11.52 ENCOUNTER FOR SCREENING FOR COVID-19: Primary | ICD-10-CM

## 2021-11-23 PROCEDURE — 3008F BODY MASS INDEX DOCD: CPT | Performed by: NURSE PRACTITIONER

## 2021-11-23 PROCEDURE — 99212 OFFICE O/P EST SF 10 MIN: CPT | Performed by: NURSE PRACTITIONER

## 2021-11-23 PROCEDURE — 3079F DIAST BP 80-89 MM HG: CPT | Performed by: NURSE PRACTITIONER

## 2021-11-23 PROCEDURE — 3075F SYST BP GE 130 - 139MM HG: CPT | Performed by: NURSE PRACTITIONER

## 2021-11-24 NOTE — PROGRESS NOTES
CHIEF COMPLAINT:   Patient presents with:  Covid-19 Test: needed for work, not fully vaccinated, no known exposure to covid per pt       HPI:   Kristan Lynne is a 23year old female who presents for Covid 19 testing for work.    At this time, not experienci RRR without murmur  EXTREMITIES: no cyanosis, clubbing or edema  LYMPH:  no lymphadenopathy.     NEURO: No focal deficits       ASSESSMENT AND PLAN:   Sarah Hung is a 23year old female who presents with     ASSESSMENT: Encounter for screening for covid-

## 2021-11-30 ENCOUNTER — OFFICE VISIT (OUTPATIENT)
Dept: OBGYN CLINIC | Facility: CLINIC | Age: 26
End: 2021-11-30
Payer: COMMERCIAL

## 2021-11-30 VITALS
BODY MASS INDEX: 37.29 KG/M2 | DIASTOLIC BLOOD PRESSURE: 74 MMHG | WEIGHT: 202.63 LBS | HEIGHT: 62 IN | SYSTOLIC BLOOD PRESSURE: 122 MMHG

## 2021-11-30 DIAGNOSIS — Z01.419 ENCOUNTER FOR WELL WOMAN EXAM WITH ROUTINE GYNECOLOGICAL EXAM: Primary | ICD-10-CM

## 2021-11-30 DIAGNOSIS — Z01.419 ENCOUNTER FOR GYNECOLOGICAL EXAMINATION WITHOUT ABNORMAL FINDING: ICD-10-CM

## 2021-11-30 PROCEDURE — 3078F DIAST BP <80 MM HG: CPT | Performed by: OBSTETRICS & GYNECOLOGY

## 2021-11-30 PROCEDURE — 3008F BODY MASS INDEX DOCD: CPT | Performed by: OBSTETRICS & GYNECOLOGY

## 2021-11-30 PROCEDURE — 3074F SYST BP LT 130 MM HG: CPT | Performed by: OBSTETRICS & GYNECOLOGY

## 2021-11-30 PROCEDURE — 99395 PREV VISIT EST AGE 18-39: CPT | Performed by: OBSTETRICS & GYNECOLOGY

## 2021-11-30 NOTE — PROGRESS NOTES
Subjective:   Patient ID: Ale Pan is a 23year old female. Patient here for routine exam.  Patient and  thinking of conceiving next year and is concerned that she has not conceived even though the do not use contraception.   Discussed Semen No adnexal masses. NT. Musculoskeletal:         General: Normal range of motion. Cervical back: Normal range of motion and neck supple. Lymphadenopathy:      Cervical: No cervical adenopathy. Skin:     General: Skin is warm and dry.    Neurologi

## 2021-12-01 ENCOUNTER — OFFICE VISIT (OUTPATIENT)
Dept: FAMILY MEDICINE CLINIC | Facility: CLINIC | Age: 26
End: 2021-12-01
Payer: COMMERCIAL

## 2021-12-01 VITALS
DIASTOLIC BLOOD PRESSURE: 77 MMHG | BODY MASS INDEX: 37.17 KG/M2 | HEIGHT: 62 IN | SYSTOLIC BLOOD PRESSURE: 120 MMHG | RESPIRATION RATE: 16 BRPM | WEIGHT: 202 LBS | TEMPERATURE: 98 F | HEART RATE: 85 BPM | OXYGEN SATURATION: 99 %

## 2021-12-01 DIAGNOSIS — Z20.822 SUSPECTED COVID-19 VIRUS INFECTION: Primary | ICD-10-CM

## 2021-12-01 PROCEDURE — 3074F SYST BP LT 130 MM HG: CPT | Performed by: NURSE PRACTITIONER

## 2021-12-01 PROCEDURE — 3008F BODY MASS INDEX DOCD: CPT | Performed by: NURSE PRACTITIONER

## 2021-12-01 PROCEDURE — 3078F DIAST BP <80 MM HG: CPT | Performed by: NURSE PRACTITIONER

## 2021-12-01 PROCEDURE — 99213 OFFICE O/P EST LOW 20 MIN: CPT | Performed by: NURSE PRACTITIONER

## 2021-12-01 NOTE — PATIENT INSTRUCTIONS
COVID-19 and the Flu: What's the Difference? Flu season happens every year in the [place] in the fall and winter. COVID-19 has similar symptoms. How do you know if someone has the flu or COVID-19? What are the differences between these 2 illnesses?  Can you roll-out will go to healthcare staff and residents of long-term care facilities. Other risk groups will be added regularly. Check your local community's roll-out plans. The vaccines are given as a shot (injection) in the arm muscle. Two doses are needed.  Chrissy Rosales to help public health experts track infection rates. It won’t replace separate flu and COVID-19 tests. There are different kinds of tests for COVID-19. Some check for active infection.  Others check for antibodies in the blood that are a sign of a past COVI failure  · Pneumonia  · Sepsis  · Sinus infection  · Worsening of chronic conditions of the lungs, heart, and nervous system  · Worsening of diabetes Can include:  · Acute respiratory distress syndrome (ARDS)  · Bacterial infections  · Heart attack  · Infl through coughing, sneezing, talking, or touching infected surfaces and touching your eyes, nose, or mouth It spreads from person to person through coughing, sneezing, talking, or touching infected surfaces and touching your eyes, nose, or mouth.  Current re include:  · Fever  · Chills  · Body aches  · Cough  · Sore throat  · Stuffy or runny nose  · Headache  · Tiredness  · Feeling short of breath  · New loss of taste or smell  · Nausea  · Vomiting  · Diarrhea   When symptoms start Usually 1 to 4 days after in time you’re sick. These medicines need to be taken as soon as possible when you start to feel sick. Antibiotics are not used because they don’t work on the flu virus.  But antibiotics may be used to prevent or treat an infection by bacteria that can sometim syndrome in children (MIS-C), a rare complication that causes inflammation of blood vessels and organs      Why getting a flu vaccine is important now  A flu vaccine doesn’t protect you from COVID-19.  But it can reduce your risk of serious illness or death

## 2021-12-01 NOTE — PROGRESS NOTES
CHIEF COMPLAINT:   Patient presents with:  Covid-19 Test: requires testing until \"fully vaccinated\"      HPI:   Froy Lara is a 23year old female who presents for Covid 19 testing, until she is immunized 2 weeks past second vaccine.    At this time, n bulging, no retraction, no fluid, bony landmarks visualized  NOSE: Nostrils patent, clear nasal discharge, nasal mucosa pink. THROAT: Oral mucosa pink, moist. Posterior pharynx is not erythematous. without exudates.     NECK: Supple, non-tender  LUNGS: goldie

## 2021-12-06 ENCOUNTER — OFFICE VISIT (OUTPATIENT)
Dept: FAMILY MEDICINE CLINIC | Facility: CLINIC | Age: 26
End: 2021-12-06
Payer: COMMERCIAL

## 2021-12-06 VITALS
TEMPERATURE: 99 F | HEART RATE: 66 BPM | OXYGEN SATURATION: 98 % | DIASTOLIC BLOOD PRESSURE: 93 MMHG | SYSTOLIC BLOOD PRESSURE: 142 MMHG

## 2021-12-06 DIAGNOSIS — Z11.52 ENCOUNTER FOR SCREENING FOR COVID-19: Primary | ICD-10-CM

## 2021-12-06 PROCEDURE — 99212 OFFICE O/P EST SF 10 MIN: CPT | Performed by: NURSE PRACTITIONER

## 2021-12-06 PROCEDURE — 3077F SYST BP >= 140 MM HG: CPT | Performed by: NURSE PRACTITIONER

## 2021-12-06 PROCEDURE — 3080F DIAST BP >= 90 MM HG: CPT | Performed by: NURSE PRACTITIONER

## 2021-12-06 NOTE — PROGRESS NOTES
Patient has been getting weekly covid tests at [facility]- work requires  Discussed if already has order in system, can schedule test through [facility] and collect at lab location.      CHIEF COMPLAINT:   Patient presents with:  Covid-19 Test      HPI:   Angela Parkinson is PLAN:    COVID-19 test sent. Turn around time of results discussed. There are no Patient Instructions on file for this visit.

## 2021-12-21 ENCOUNTER — OFFICE VISIT (OUTPATIENT)
Dept: FAMILY MEDICINE CLINIC | Facility: CLINIC | Age: 26
End: 2021-12-21
Payer: COMMERCIAL

## 2021-12-21 VITALS
TEMPERATURE: 98 F | HEART RATE: 85 BPM | RESPIRATION RATE: 14 BRPM | SYSTOLIC BLOOD PRESSURE: 125 MMHG | OXYGEN SATURATION: 99 % | DIASTOLIC BLOOD PRESSURE: 82 MMHG | WEIGHT: 200 LBS | BODY MASS INDEX: 36.8 KG/M2 | HEIGHT: 62 IN

## 2021-12-21 DIAGNOSIS — Z11.52 ENCOUNTER FOR SCREENING FOR COVID-19: Primary | ICD-10-CM

## 2021-12-21 PROCEDURE — 99213 OFFICE O/P EST LOW 20 MIN: CPT | Performed by: NURSE PRACTITIONER

## 2021-12-21 PROCEDURE — 3074F SYST BP LT 130 MM HG: CPT | Performed by: NURSE PRACTITIONER

## 2021-12-21 PROCEDURE — 3079F DIAST BP 80-89 MM HG: CPT | Performed by: NURSE PRACTITIONER

## 2021-12-21 PROCEDURE — 3008F BODY MASS INDEX DOCD: CPT | Performed by: NURSE PRACTITIONER

## 2021-12-22 NOTE — PROGRESS NOTES
CHIEF COMPLAINT:   Patient presents with:  Covid-19 Test      HPI:   Star Tanner is a 23year old female who presents for Covid 19 weekly screening. At this time, not experiencing upper respiratory symptoms, fever, or gastrointestinal symptoms.       Ful bilaterally; good air movement. Breathing is non labored. CARDIO: RRR without murmur  EXTREMITIES: no cyanosis, clubbing or edema  LYMPH:  No cervical lymphadenopathy.     NEURO: No focal deficits       ASSESSMENT AND PLAN:   Joyce Lott is a 23 year ol

## 2022-04-28 ENCOUNTER — OFFICE VISIT (OUTPATIENT)
Dept: FAMILY MEDICINE CLINIC | Facility: CLINIC | Age: 27
End: 2022-04-28
Payer: COMMERCIAL

## 2022-04-28 VITALS
WEIGHT: 200 LBS | RESPIRATION RATE: 15 BRPM | SYSTOLIC BLOOD PRESSURE: 118 MMHG | HEART RATE: 92 BPM | HEIGHT: 62 IN | DIASTOLIC BLOOD PRESSURE: 73 MMHG | BODY MASS INDEX: 36.8 KG/M2 | OXYGEN SATURATION: 99 % | TEMPERATURE: 98 F

## 2022-04-28 DIAGNOSIS — H61.23 BILATERAL IMPACTED CERUMEN: ICD-10-CM

## 2022-04-28 DIAGNOSIS — J00 NASOPHARYNGITIS: Primary | ICD-10-CM

## 2022-04-28 LAB
CONTROL LINE PRESENT WITH A CLEAR BACKGROUND (YES/NO): YES YES/NO
STREP GRP A CUL-SCR: NEGATIVE

## 2022-04-28 PROCEDURE — 3078F DIAST BP <80 MM HG: CPT | Performed by: NURSE PRACTITIONER

## 2022-04-28 PROCEDURE — 3008F BODY MASS INDEX DOCD: CPT | Performed by: NURSE PRACTITIONER

## 2022-04-28 PROCEDURE — 99213 OFFICE O/P EST LOW 20 MIN: CPT | Performed by: NURSE PRACTITIONER

## 2022-04-28 PROCEDURE — 87880 STREP A ASSAY W/OPTIC: CPT | Performed by: NURSE PRACTITIONER

## 2022-04-28 PROCEDURE — 3074F SYST BP LT 130 MM HG: CPT | Performed by: NURSE PRACTITIONER

## 2022-05-06 ENCOUNTER — OFFICE VISIT (OUTPATIENT)
Dept: PHYSICAL MEDICINE AND REHAB | Facility: CLINIC | Age: 27
End: 2022-05-06
Payer: COMMERCIAL

## 2022-05-06 VITALS
BODY MASS INDEX: 36.8 KG/M2 | DIASTOLIC BLOOD PRESSURE: 90 MMHG | SYSTOLIC BLOOD PRESSURE: 128 MMHG | HEIGHT: 62 IN | WEIGHT: 200 LBS

## 2022-05-06 DIAGNOSIS — G47.00 INSOMNIA, UNSPECIFIED TYPE: ICD-10-CM

## 2022-05-06 DIAGNOSIS — G89.29 CHRONIC BILATERAL LOW BACK PAIN WITHOUT SCIATICA: Primary | ICD-10-CM

## 2022-05-06 DIAGNOSIS — E66.09 CLASS 2 OBESITY DUE TO EXCESS CALORIES WITHOUT SERIOUS COMORBIDITY WITH BODY MASS INDEX (BMI) OF 36.0 TO 36.9 IN ADULT: ICD-10-CM

## 2022-05-06 DIAGNOSIS — M79.18 MYOFASCIAL PAIN: ICD-10-CM

## 2022-05-06 DIAGNOSIS — M54.50 CHRONIC BILATERAL LOW BACK PAIN WITHOUT SCIATICA: Primary | ICD-10-CM

## 2022-05-09 PROBLEM — G47.00 INSOMNIA: Status: ACTIVE | Noted: 2022-05-09

## 2022-05-09 PROBLEM — M79.18 MYOFASCIAL PAIN: Status: ACTIVE | Noted: 2022-05-09

## 2022-05-09 PROBLEM — E66.09 CLASS 2 OBESITY DUE TO EXCESS CALORIES WITHOUT SERIOUS COMORBIDITY WITH BODY MASS INDEX (BMI) OF 36.0 TO 36.9 IN ADULT: Status: ACTIVE | Noted: 2022-05-09

## 2022-05-09 PROBLEM — E66.812 CLASS 2 OBESITY DUE TO EXCESS CALORIES WITHOUT SERIOUS COMORBIDITY WITH BODY MASS INDEX (BMI) OF 36.0 TO 36.9 IN ADULT: Status: ACTIVE | Noted: 2022-05-09

## 2022-05-17 ENCOUNTER — TELEPHONE (OUTPATIENT)
Dept: NEUROLOGY | Facility: CLINIC | Age: 27
End: 2022-05-17

## 2022-05-17 NOTE — TELEPHONE ENCOUNTER
Patient came in to provide CD containing MRI & X-Ray results. Placed in 's folder by the nurses bin, this can be returned to her home address when scanning is completed.

## 2022-05-18 ENCOUNTER — TELEPHONE (OUTPATIENT)
Dept: PHYSICAL MEDICINE AND REHAB | Facility: CLINIC | Age: 27
End: 2022-05-18

## 2022-05-18 NOTE — TELEPHONE ENCOUNTER
Please advise on PT order, none has been placed. LOV: 5/6/22  1. Chronic bilateral low back pain without sciatica  I do not think that there is a significant structural element. I do not think she would benefit from an epidural injection or surgery. We discussed options including trigger point injections, continuing dry needling, aerobic conditioning, consideration of Cymbalta. She will consider these options.

## 2022-05-19 NOTE — TELEPHONE ENCOUNTER
I personally reviewed a lumbar MRI from April 18, 2022 showing minimal L5-S1 disc degeneration, no nerve root contact, no foraminal narrowing, no facet arthropathy. I personally reviewed a plain film x-ray of the lumbar spine which was unremarkable. Please of the patient know that I reviewed her MRI and x-ray. The good news is that it looks like there are no pinched nerves. There is one disc that is a little dehydrated and is not contacting any nerves. After reviewing the films, I do not think she would benefit from an epidural injection or surgery. Options include trigger point injections, continuing dry needling, aerobic exercise, and consideration of Cymbalta. We discussed these things at her office visit. Please let me know which she would like to do.   Thanks

## 2022-05-19 NOTE — TELEPHONE ENCOUNTER
Patient will consider Dr. Chivo Plasencia recommendations and get back to us with what she'd like to continue with.

## 2022-05-20 ENCOUNTER — PATIENT MESSAGE (OUTPATIENT)
Dept: PHYSICAL MEDICINE AND REHAB | Facility: CLINIC | Age: 27
End: 2022-05-20

## 2022-05-20 DIAGNOSIS — M54.50 MYOFASCIAL LOW BACK PAIN: Primary | ICD-10-CM

## 2022-05-23 RX ORDER — DULOXETIN HYDROCHLORIDE 30 MG/1
30 CAPSULE, DELAYED RELEASE ORAL DAILY
Qty: 30 CAPSULE | Refills: 0 | Status: SHIPPED | OUTPATIENT
Start: 2022-05-23

## 2022-05-23 NOTE — TELEPHONE ENCOUNTER
Cymbalta and PT orders signed, please send her the PT order.   Thank you, return in 1 month, please help her make that appointment

## 2022-06-02 ENCOUNTER — APPOINTMENT (OUTPATIENT)
Dept: URBAN - METROPOLITAN AREA CLINIC 244 | Age: 27
Setting detail: DERMATOLOGY
End: 2022-06-03

## 2022-06-02 DIAGNOSIS — L71.8 OTHER ROSACEA: ICD-10-CM

## 2022-06-02 DIAGNOSIS — L21.8 OTHER SEBORRHEIC DERMATITIS: ICD-10-CM

## 2022-06-02 DIAGNOSIS — L60.3 NAIL DYSTROPHY: ICD-10-CM

## 2022-06-02 PROCEDURE — 99204 OFFICE O/P NEW MOD 45 MIN: CPT

## 2022-06-02 PROCEDURE — OTHER PRESCRIPTION: OTHER

## 2022-06-02 PROCEDURE — OTHER ADDITIONAL NOTES: OTHER

## 2022-06-02 PROCEDURE — OTHER COUNSELING: OTHER

## 2022-06-02 RX ORDER — KETOCONAZOLE 20 MG/ML
SHAMPOO, SUSPENSION TOPICAL
Qty: 240 | Refills: 11 | Status: ERX | COMMUNITY
Start: 2022-06-02

## 2022-06-02 ASSESSMENT — LOCATION SIMPLE DESCRIPTION DERM
LOCATION SIMPLE: LEFT CHEEK
LOCATION SIMPLE: RIGHT CHEEK
LOCATION SIMPLE: INFERIOR FOREHEAD
LOCATION SIMPLE: LEFT THUMBNAIL

## 2022-06-02 ASSESSMENT — LOCATION ZONE DERM
LOCATION ZONE: FACE
LOCATION ZONE: FINGERNAIL

## 2022-06-02 ASSESSMENT — LOCATION DETAILED DESCRIPTION DERM
LOCATION DETAILED: LEFT THUMBNAIL
LOCATION DETAILED: RIGHT CENTRAL MALAR CHEEK
LOCATION DETAILED: LEFT INFERIOR CENTRAL MALAR CHEEK
LOCATION DETAILED: INFERIOR MID FOREHEAD

## 2022-06-02 NOTE — PROCEDURE: ADDITIONAL NOTES
Detail Level: Simple
Render Risk Assessment In Note?: no
Additional Notes: Gave samples of Mindi cream

## 2022-06-08 ENCOUNTER — TELEPHONE (OUTPATIENT)
Dept: NEUROLOGY | Facility: CLINIC | Age: 27
End: 2022-06-08

## 2022-06-08 NOTE — TELEPHONE ENCOUNTER
Received PT Plan of Care to be signed by physician and fax back when completed to [phone]. Placed in 's bin.

## 2022-06-09 ENCOUNTER — TELEPHONE (OUTPATIENT)
Dept: OBGYN CLINIC | Facility: CLINIC | Age: 27
End: 2022-06-09

## 2022-06-09 NOTE — TELEPHONE ENCOUNTER
Patient name and  verified. Patient states spouse had semen analysis ordered. Per patient will not release results to spouse only to [facility]. Advised patient results will be released to ordering provider and spouse should contact pcp for results. Patient verbalized understanding.

## 2022-06-09 NOTE — TELEPHONE ENCOUNTER
Pt states spouse had a semen analysis done and states they weren't able to release results to his primary doctor, was told that results would have to get sent to [facility].  Please advise pt needs clarification on what they need to do

## 2022-06-22 ENCOUNTER — MED REC SCAN ONLY (OUTPATIENT)
Dept: PHYSICAL MEDICINE AND REHAB | Facility: CLINIC | Age: 27
End: 2022-06-22

## 2022-06-29 ENCOUNTER — TELEPHONE (OUTPATIENT)
Dept: PHYSICAL MEDICINE AND REHAB | Facility: CLINIC | Age: 27
End: 2022-06-29

## 2022-06-29 NOTE — TELEPHONE ENCOUNTER
Per Dr. Juan Crooks - Stomach upset, light headedness/dizziness are common side effects but if they are unbearable patient may stop taking Cymbalta, but if they are mild, these symptoms usually resolve after 5 days of daily use. Dr. Juan Crooks has no further medication recommendations/options at this time. If patient wishes to discontinue the cymbalta, patient may schedule a follow-up appointment with Dr. Juan Crooks to discuss further options for plan of care.

## 2022-06-29 NOTE — TELEPHONE ENCOUNTER
Patient calling stating that she is experiencing side effects from medication prescribed by Dr. Becky Fierro. Duloxetine was taken by patient on 5/26/2022 experience light-headedness, dizziness, stomach upset. She took medication again last night, and experienced nausea, vomiting, stomach upset and dizziness/light headedness. Patient also reporting that from her previous ACL surgery, she also experienced stomach upset with stronger pain medications per patient report. Will discuss patient update with Dr. Becky Fierro at this time.

## 2022-06-29 NOTE — TELEPHONE ENCOUNTER
S/w patient to relay Dr. Oren Carr advice at this time. Patient verbalized understanding and will attempt to take Cymbalta on a consistent basis and keep us updated on her symptoms/side effects. If no better after 5 days patient will discontinue cymbalta, call our office back and schedule a follow-up visit.

## 2022-07-12 ENCOUNTER — OFFICE VISIT (OUTPATIENT)
Dept: FAMILY MEDICINE CLINIC | Facility: CLINIC | Age: 27
End: 2022-07-12
Payer: COMMERCIAL

## 2022-07-12 VITALS
DIASTOLIC BLOOD PRESSURE: 75 MMHG | HEART RATE: 95 BPM | BODY MASS INDEX: 39.53 KG/M2 | TEMPERATURE: 98 F | HEIGHT: 62 IN | WEIGHT: 214.81 LBS | OXYGEN SATURATION: 100 % | RESPIRATION RATE: 16 BRPM | SYSTOLIC BLOOD PRESSURE: 118 MMHG
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
## 2022-07-12 DIAGNOSIS — R59.0 LAD (LYMPHADENOPATHY), SUBMANDIBULAR: Primary | ICD-10-CM

## 2022-07-12 PROCEDURE — 3008F BODY MASS INDEX DOCD: CPT | Performed by: NURSE PRACTITIONER

## 2022-07-12 PROCEDURE — 3078F DIAST BP <80 MM HG: CPT | Performed by: NURSE PRACTITIONER

## 2022-07-12 PROCEDURE — 99213 OFFICE O/P EST LOW 20 MIN: CPT | Performed by: NURSE PRACTITIONER

## 2022-07-12 PROCEDURE — 3074F SYST BP LT 130 MM HG: CPT | Performed by: NURSE PRACTITIONER

## 2022-07-12 RX ORDER — CEPHALEXIN 500 MG/1
CAPSULE ORAL
Qty: 21 CAPSULE | Refills: 0 | Status: SHIPPED | OUTPATIENT
Start: 2022-07-12

## 2022-09-01 ENCOUNTER — MED REC SCAN ONLY (OUTPATIENT)
Dept: PHYSICAL MEDICINE AND REHAB | Facility: CLINIC | Age: 27
End: 2022-09-01

## 2022-09-07 ENCOUNTER — TELEPHONE (OUTPATIENT)
Dept: NEUROLOGY | Facility: CLINIC | Age: 27
End: 2022-09-07

## 2022-09-07 ENCOUNTER — OFFICE VISIT (OUTPATIENT)
Dept: PHYSICAL MEDICINE AND REHAB | Facility: CLINIC | Age: 27
End: 2022-09-07
Payer: COMMERCIAL

## 2022-09-07 VITALS — HEART RATE: 107 BPM | WEIGHT: 209 LBS | OXYGEN SATURATION: 98 % | HEIGHT: 62 IN | BODY MASS INDEX: 38.46 KG/M2

## 2022-09-07 DIAGNOSIS — R79.89 LOW VITAMIN D LEVEL: ICD-10-CM

## 2022-09-07 DIAGNOSIS — M54.50 MYOFASCIAL LOW BACK PAIN: ICD-10-CM

## 2022-09-07 DIAGNOSIS — G47.00 INSOMNIA, UNSPECIFIED TYPE: ICD-10-CM

## 2022-09-07 DIAGNOSIS — M47.816 LUMBAR SPONDYLOSIS: Primary | ICD-10-CM

## 2022-09-07 DIAGNOSIS — E66.09 CLASS 2 OBESITY DUE TO EXCESS CALORIES WITHOUT SERIOUS COMORBIDITY WITH BODY MASS INDEX (BMI) OF 36.0 TO 36.9 IN ADULT: ICD-10-CM

## 2022-09-07 PROCEDURE — 3008F BODY MASS INDEX DOCD: CPT | Performed by: PHYSICAL MEDICINE & REHABILITATION

## 2022-09-07 PROCEDURE — 99214 OFFICE O/P EST MOD 30 MIN: CPT | Performed by: PHYSICAL MEDICINE & REHABILITATION

## 2022-09-07 RX ORDER — FERROUS SULFATE 325(65) MG
325 TABLET ORAL
COMMUNITY
Start: 2022-08-05

## 2022-09-07 RX ORDER — DICLOFENAC SODIUM 20 MG/G
SOLUTION TOPICAL
COMMUNITY
Start: 2022-09-06

## 2022-09-07 RX ORDER — KETOCONAZOLE 20 MG/ML
SHAMPOO TOPICAL
COMMUNITY
Start: 2022-06-02

## 2022-09-07 RX ORDER — ERGOCALCIFEROL (VITAMIN D2) 1250 MCG
50000 CAPSULE ORAL WEEKLY
COMMUNITY
Start: 2022-08-03 | End: 2022-10-20

## 2022-09-07 RX ORDER — BACLOFEN 10 MG/1
TABLET ORAL
COMMUNITY

## 2022-09-07 RX ORDER — PSEUDOEPHEDRINE HCL 30 MG
100 TABLET ORAL 2 TIMES DAILY PRN
COMMUNITY
Start: 2022-08-05

## 2022-09-07 NOTE — TELEPHONE ENCOUNTER
[employer] for authorization of Bilateral L2, L3, L4, L5 medial branch blocks cpt codes [id number], [id number], [id number], [id number]. Authorization is not required. Madison Abraham   Transaction ID: [id number]. Will  inform Nursing.

## 2022-09-15 ENCOUNTER — OFFICE VISIT (OUTPATIENT)
Dept: SURGERY | Facility: CLINIC | Age: 27
End: 2022-09-15

## 2022-09-15 DIAGNOSIS — M47.816 LUMBAR SPONDYLOSIS: Primary | ICD-10-CM

## 2022-09-15 PROCEDURE — 64494 INJ PARAVERT F JNT L/S 2 LEV: CPT | Performed by: PHYSICAL MEDICINE & REHABILITATION

## 2022-09-15 PROCEDURE — 64493 INJ PARAVERT F JNT L/S 1 LEV: CPT | Performed by: PHYSICAL MEDICINE & REHABILITATION

## 2022-09-15 PROCEDURE — 64495 INJ PARAVERT F JNT L/S 3 LEV: CPT | Performed by: PHYSICAL MEDICINE & REHABILITATION

## 2022-09-15 PROCEDURE — 99153 MOD SED SAME PHYS/QHP EA: CPT | Performed by: PHYSICAL MEDICINE & REHABILITATION

## 2022-09-15 PROCEDURE — 99152 MOD SED SAME PHYS/QHP 5/>YRS: CPT | Performed by: PHYSICAL MEDICINE & REHABILITATION

## 2022-09-15 NOTE — PROCEDURES
Preoperative Diagnosis:  (M47.816) Lumbar spondylosis  (primary encounter diagnosis)       Postoperative Diagnosis:  (M47.816) Lumbar spondylosis  (primary encounter diagnosis)       Procedures:  Bilateral L2, L3, L4 and L5 medial branch block injection(s) under fluoroscopic guidance and contrast enhancement. Surgeon:  Kristin Gutierrez M.D. Anesthesia:  Conscious Sedation    OPERATIVE PROCEDURE:  The patient was consented. She was brought into the operating room and placed on the fluoroscopy table in the prone position. She was sterilely prepped and draped in routine fashion. The lumbar spine was visualized in AP. The overlying skin was anesthetized. 22-gauge spinal needles were introduced and directed towards the junction of the superior articular process and the transverse processes at the Right L2, L3, L4 and L5 medial branch locations. Needle position was verified using fluoroscopy and radiographic contrast showing nonvascular and fascial spread. Radiographic interpretation was that the needle was in proper position for medial branch block injection(s). I placed 1.5 mL of a mixture of 10 mL 1% preservative-free lidocaine and 2 mL 40 mg/mL Kenalog into each medial branch location. The same procedure was performed on the contralateral side. Conscious sedation was used for the procedure using Versed and fentanyl using continuous cardiovascular monitoring. There were no ill effects from the procedure. The patient was arousable throughout the procedure. Duration of the procedure was approximately 30 minutes. The patient tolerated the procedure well without any immediate complications. She was given discharge instructions and is to follow up with me in approximately two weeks.

## 2022-09-30 ENCOUNTER — TELEPHONE (OUTPATIENT)
Dept: NEUROLOGY | Facility: CLINIC | Age: 27
End: 2022-09-30

## 2022-09-30 ENCOUNTER — OFFICE VISIT (OUTPATIENT)
Dept: PHYSICAL MEDICINE AND REHAB | Facility: CLINIC | Age: 27
End: 2022-09-30
Payer: COMMERCIAL

## 2022-09-30 VITALS
DIASTOLIC BLOOD PRESSURE: 66 MMHG | BODY MASS INDEX: 38.46 KG/M2 | SYSTOLIC BLOOD PRESSURE: 122 MMHG | HEART RATE: 68 BPM | OXYGEN SATURATION: 97 % | WEIGHT: 209 LBS | HEIGHT: 62 IN

## 2022-09-30 DIAGNOSIS — M47.816 LUMBAR SPONDYLOSIS: ICD-10-CM

## 2022-09-30 DIAGNOSIS — M54.50 MYOFASCIAL LOW BACK PAIN: Primary | ICD-10-CM

## 2022-09-30 PROCEDURE — 3078F DIAST BP <80 MM HG: CPT | Performed by: PHYSICAL MEDICINE & REHABILITATION

## 2022-09-30 PROCEDURE — 3074F SYST BP LT 130 MM HG: CPT | Performed by: PHYSICAL MEDICINE & REHABILITATION

## 2022-09-30 PROCEDURE — 99214 OFFICE O/P EST MOD 30 MIN: CPT | Performed by: PHYSICAL MEDICINE & REHABILITATION

## 2022-09-30 PROCEDURE — 3008F BODY MASS INDEX DOCD: CPT | Performed by: PHYSICAL MEDICINE & REHABILITATION

## 2022-09-30 NOTE — TELEPHONE ENCOUNTER
BMI: 38.23 kg/m2: restrictions with injection. Patient has been scheduled for Bilateral L2- L3, L4- L5 medial branch blocks on 10/6/22 at the [facility] with Dr. Sabas Mendez.   -Anesthesia type: Local.  -If scheduling [facility] covid testing required for all procedures whether patient is vaccinated or not. -Patient informed not to eat or drink anything after midnight the night prior to the procedure, if being sedated. -Patient was advised that if he/she does receive the covid vaccine it needs to be at least 2 weeks before or after the injection. -Medications and allergies reviewed. -Patient reminded to hold NSAIDs (Ibuprofen, ASA 81, Aleve, Naproxen, Mobic, Diclofenac, Etodolac, Celebrex etc.) for 3 days prior to [place]  if BMI is greater than 35. For Cervical injections only hold multivitamins, Vitamin E, Fish Oil, Phentermine (Lomaira) for 7 days prior to injection and NSAIDS.  mg to be held for 7 days prior to injections.  -If patient is receiving MAC/IVCS Phentermine Stoney Guillermo) will need to be held for 7 days prior to injection.  -If on blood thinner clearance has been received to hold this medication by provider.   -Patient informed he/she will need a  to and from procedure. -[facility] is located in the [facility] 1st floor. Patient may park in the yellow parking. Patient verbalized understanding and agrees with plan.  -----> Scheduled in Epic: Yes  -----> Scheduled in [employer]:  Yes

## 2022-09-30 NOTE — TELEPHONE ENCOUNTER
[employer] for authorization of Bilateral L2- L3, L4- L5 medial branch blocks  cpt codes [id number], [id number], [id number]. Sandra Alvarez Transaction ID: [id number]. Authorization is not required. .  Will inform Nursing.

## 2022-10-03 ENCOUNTER — OFFICE VISIT (OUTPATIENT)
Dept: OBGYN CLINIC | Facility: CLINIC | Age: 27
End: 2022-10-03
Payer: COMMERCIAL

## 2022-10-03 ENCOUNTER — LAB ENCOUNTER (OUTPATIENT)
Dept: LAB | Facility: HOSPITAL | Age: 27
End: 2022-10-03
Attending: OBSTETRICS & GYNECOLOGY
Payer: COMMERCIAL

## 2022-10-03 ENCOUNTER — MED REC SCAN ONLY (OUTPATIENT)
Dept: PHYSICAL MEDICINE AND REHAB | Facility: CLINIC | Age: 27
End: 2022-10-03

## 2022-10-03 VITALS — WEIGHT: 208.19 LBS | DIASTOLIC BLOOD PRESSURE: 81 MMHG | SYSTOLIC BLOOD PRESSURE: 111 MMHG | BODY MASS INDEX: 38 KG/M2

## 2022-10-03 DIAGNOSIS — N92.6 MISSED MENSES: ICD-10-CM

## 2022-10-03 DIAGNOSIS — N91.2 AMENORRHEA: ICD-10-CM

## 2022-10-03 DIAGNOSIS — N92.6 MISSED MENSES: Primary | ICD-10-CM

## 2022-10-03 PROBLEM — M47.816 LUMBAR SPONDYLOSIS: Status: ACTIVE | Noted: 2022-10-03

## 2022-10-03 LAB — B-HCG SERPL-ACNC: <1 MIU/ML

## 2022-10-03 PROCEDURE — 3079F DIAST BP 80-89 MM HG: CPT | Performed by: OBSTETRICS & GYNECOLOGY

## 2022-10-03 PROCEDURE — 99214 OFFICE O/P EST MOD 30 MIN: CPT | Performed by: OBSTETRICS & GYNECOLOGY

## 2022-10-03 PROCEDURE — 84702 CHORIONIC GONADOTROPIN TEST: CPT

## 2022-10-03 PROCEDURE — 3074F SYST BP LT 130 MM HG: CPT | Performed by: OBSTETRICS & GYNECOLOGY

## 2022-10-03 PROCEDURE — 36415 COLL VENOUS BLD VENIPUNCTURE: CPT

## 2022-10-06 ENCOUNTER — TELEPHONE (OUTPATIENT)
Dept: OBGYN CLINIC | Facility: CLINIC | Age: 27
End: 2022-10-06

## 2022-10-06 ENCOUNTER — OFFICE VISIT (OUTPATIENT)
Dept: SURGERY | Facility: CLINIC | Age: 27
End: 2022-10-06

## 2022-10-06 DIAGNOSIS — M47.816 LUMBAR SPONDYLOSIS: Primary | ICD-10-CM

## 2022-10-06 PROCEDURE — 64493 INJ PARAVERT F JNT L/S 1 LEV: CPT | Performed by: PHYSICAL MEDICINE & REHABILITATION

## 2022-10-06 PROCEDURE — 64495 INJ PARAVERT F JNT L/S 3 LEV: CPT | Performed by: PHYSICAL MEDICINE & REHABILITATION

## 2022-10-06 PROCEDURE — 64494 INJ PARAVERT F JNT L/S 2 LEV: CPT | Performed by: PHYSICAL MEDICINE & REHABILITATION

## 2022-10-06 NOTE — TELEPHONE ENCOUNTER
Pt Name and  verified. Pt states she cannot come in next week and would like to be seen sometime the week of the . Pt rescheduled for 10/20. No other questions.

## 2022-10-06 NOTE — PROCEDURES
Preoperative Diagnosis:  (M47.816) Lumbar spondylosis  (primary encounter diagnosis)       Postoperative Diagnosis:  (M47.816) Lumbar spondylosis  (primary encounter diagnosis)       Procedures:  Bilateral L2, L3, L4 and L5 medial branch block injection(s) under fluoroscopic guidance and contrast enhancement. Surgeon:  Briseyda Cabrales M.D. Anesthesia:  Local    OPERATIVE PROCEDURE:  The patient was consented. She was brought into the operating room and placed on the fluoroscopy table in the prone position. She was sterilely prepped and draped in routine fashion. The lumbar spine was visualized in AP. The overlying skin was anesthetized. 22-gauge spinal needles were introduced and directed towards the junction of the superior articular process and the transverse processes at the Right L2, L3, L4 and L5 medial branch locations. Needle position was verified using fluoroscopy and radiographic contrast showing nonvascular and fascial spread. Radiographic interpretation was that the needle was in proper position for medial branch block injection(s). I placed 0.5 mL of 4% preservative-free lidocaine into each medial branch location. The same procedure was performed on the contralateral side. The patient tolerated the procedure well without any immediate complications. She was given discharge instructions and is to follow up with me in approximately two weeks.

## 2022-10-06 NOTE — TELEPHONE ENCOUNTER
Pt needs to reschedule 10/12/22 follow up  Appointment With Dr. Chino rodriguez during the week of 10/17/22. No appointments available. Please call.

## 2022-10-19 ENCOUNTER — OFFICE VISIT (OUTPATIENT)
Dept: PHYSICAL MEDICINE AND REHAB | Facility: CLINIC | Age: 27
End: 2022-10-19
Payer: COMMERCIAL

## 2022-10-19 VITALS — BODY MASS INDEX: 38.64 KG/M2 | HEART RATE: 101 BPM | WEIGHT: 210 LBS | OXYGEN SATURATION: 98 % | HEIGHT: 62 IN

## 2022-10-19 DIAGNOSIS — M54.50 MYOFASCIAL LOW BACK PAIN: Primary | ICD-10-CM

## 2022-10-19 DIAGNOSIS — M47.816 LUMBAR SPONDYLOSIS: ICD-10-CM

## 2022-10-19 DIAGNOSIS — E66.09 CLASS 2 OBESITY DUE TO EXCESS CALORIES WITHOUT SERIOUS COMORBIDITY WITH BODY MASS INDEX (BMI) OF 36.0 TO 36.9 IN ADULT: ICD-10-CM

## 2022-10-19 PROCEDURE — 99213 OFFICE O/P EST LOW 20 MIN: CPT | Performed by: PHYSICAL MEDICINE & REHABILITATION

## 2022-10-19 PROCEDURE — 3008F BODY MASS INDEX DOCD: CPT | Performed by: PHYSICAL MEDICINE & REHABILITATION

## 2022-10-20 ENCOUNTER — OFFICE VISIT (OUTPATIENT)
Dept: OBGYN CLINIC | Facility: CLINIC | Age: 27
End: 2022-10-20
Payer: COMMERCIAL

## 2022-10-20 VITALS — BODY MASS INDEX: 39 KG/M2 | SYSTOLIC BLOOD PRESSURE: 118 MMHG | WEIGHT: 212 LBS | DIASTOLIC BLOOD PRESSURE: 74 MMHG

## 2022-10-20 DIAGNOSIS — Z01.419 ENCOUNTER FOR GYNECOLOGICAL EXAMINATION WITHOUT ABNORMAL FINDING: Primary | ICD-10-CM

## 2022-10-20 PROCEDURE — 3078F DIAST BP <80 MM HG: CPT | Performed by: OBSTETRICS & GYNECOLOGY

## 2022-10-20 PROCEDURE — 3074F SYST BP LT 130 MM HG: CPT | Performed by: OBSTETRICS & GYNECOLOGY

## 2022-10-20 PROCEDURE — 99395 PREV VISIT EST AGE 18-39: CPT | Performed by: OBSTETRICS & GYNECOLOGY

## 2022-10-20 RX ORDER — IBUPROFEN 200 MG
200 TABLET ORAL AS DIRECTED
COMMUNITY

## 2023-02-18 ENCOUNTER — APPOINTMENT (OUTPATIENT)
Dept: GENERAL RADIOLOGY | Facility: HOSPITAL | Age: 28
End: 2023-02-18
Attending: STUDENT IN AN ORGANIZED HEALTH CARE EDUCATION/TRAINING PROGRAM
Payer: COMMERCIAL

## 2023-02-18 ENCOUNTER — APPOINTMENT (OUTPATIENT)
Dept: CT IMAGING | Facility: HOSPITAL | Age: 28
End: 2023-02-18
Attending: STUDENT IN AN ORGANIZED HEALTH CARE EDUCATION/TRAINING PROGRAM
Payer: COMMERCIAL

## 2023-02-18 ENCOUNTER — HOSPITAL ENCOUNTER (EMERGENCY)
Facility: HOSPITAL | Age: 28
Discharge: HOME | End: 2023-02-19
Attending: STUDENT IN AN ORGANIZED HEALTH CARE EDUCATION/TRAINING PROGRAM
Payer: COMMERCIAL

## 2023-02-18 DIAGNOSIS — R55 SYNCOPE, VASOVAGAL: ICD-10-CM

## 2023-02-18 DIAGNOSIS — R11.2 NAUSEA AND VOMITING, UNSPECIFIED VOMITING TYPE: Primary | ICD-10-CM

## 2023-02-18 LAB
ALBUMIN SERPL-MCNC: 3.4 G/DL (ref 3.4–5)
ALP LIVER SERPL-CCNC: 100 U/L
ALT SERPL-CCNC: 25 U/L
ANION GAP SERPL CALC-SCNC: 9 MMOL/L (ref 0–18)
B-HCG UR QL: NEGATIVE
BASOPHILS # BLD AUTO: 0.06 X10(3) UL (ref 0–0.2)
BASOPHILS NFR BLD AUTO: 0.5 %
BILIRUB SERPL-MCNC: 0.5 MG/DL (ref 0.1–2)
BILIRUB UR QL: NEGATIVE
BUN BLD-MCNC: 9 MG/DL (ref 7–18)
BUN/CREAT SERPL: 10.8 (ref 10–20)
CALCIUM BLD-MCNC: 9.5 MG/DL (ref 8.5–10.1)
CHLORIDE SERPL-SCNC: 108 MMOL/L (ref 98–112)
CLARITY UR: CLEAR
CO2 SERPL-SCNC: 27 MMOL/L (ref 21–32)
COLOR UR: YELLOW
CREAT BLD-MCNC: 0.83 MG/DL
DEPRECATED RDW RBC AUTO: 36.3 FL (ref 35.1–46.3)
EOSINOPHIL # BLD AUTO: 0.03 X10(3) UL (ref 0–0.7)
EOSINOPHIL NFR BLD AUTO: 0.3 %
ERYTHROCYTE [DISTWIDTH] IN BLOOD BY AUTOMATED COUNT: 12 % (ref 11–15)
GFR SERPLBLD BASED ON 1.73 SQ M-ARVRAT: 100 ML/MIN/1.73M2 (ref 60–?)
GLUCOSE BLD-MCNC: 99 MG/DL (ref 70–99)
GLUCOSE UR-MCNC: NEGATIVE MG/DL
HCT VFR BLD AUTO: 45.2 %
HGB BLD-MCNC: 15.1 G/DL
HGB UR QL STRIP.AUTO: NEGATIVE
IMM GRANULOCYTES # BLD AUTO: 0.03 X10(3) UL (ref 0–1)
IMM GRANULOCYTES NFR BLD: 0.3 %
KETONES UR-MCNC: NEGATIVE MG/DL
LEUKOCYTE ESTERASE UR QL STRIP.AUTO: NEGATIVE
LIPASE SERPL-CCNC: 13 U/L (ref 13–75)
LIPASE SERPL-CCNC: 60 U/L (ref 73–393)
LYMPHOCYTES # BLD AUTO: 2.81 X10(3) UL (ref 1–4)
LYMPHOCYTES NFR BLD AUTO: 24.9 %
MCH RBC QN AUTO: 28.4 PG (ref 26–34)
MCHC RBC AUTO-ENTMCNC: 33.4 G/DL (ref 31–37)
MCV RBC AUTO: 85.1 FL
MONOCYTES # BLD AUTO: 0.49 X10(3) UL (ref 0.1–1)
MONOCYTES NFR BLD AUTO: 4.3 %
NEUTROPHILS # BLD AUTO: 7.88 X10 (3) UL (ref 1.5–7.7)
NEUTROPHILS # BLD AUTO: 7.88 X10(3) UL (ref 1.5–7.7)
NEUTROPHILS NFR BLD AUTO: 69.7 %
NITRITE UR QL STRIP.AUTO: NEGATIVE
OSMOLALITY SERPL CALC.SUM OF ELEC: 297 MOSM/KG (ref 275–295)
PH UR: 6 [PH] (ref 5–8)
PLATELET # BLD AUTO: 398 10(3)UL (ref 150–450)
PROT SERPL-MCNC: 8.1 G/DL (ref 6.4–8.2)
PROT UR-MCNC: NEGATIVE MG/DL
RBC # BLD AUTO: 5.31 X10(6)UL
SODIUM SERPL-SCNC: 144 MMOL/L (ref 136–145)
SP GR UR STRIP: 1 (ref 1–1.03)
UROBILINOGEN UR STRIP-ACNC: <2
VIT C UR-MCNC: NEGATIVE MG/DL
WBC # BLD AUTO: 11.3 X10(3) UL (ref 4–11)

## 2023-02-18 PROCEDURE — 85025 COMPLETE CBC W/AUTO DIFF WBC: CPT | Performed by: STUDENT IN AN ORGANIZED HEALTH CARE EDUCATION/TRAINING PROGRAM

## 2023-02-18 PROCEDURE — 81025 URINE PREGNANCY TEST: CPT

## 2023-02-18 PROCEDURE — 81003 URINALYSIS AUTO W/O SCOPE: CPT | Performed by: STUDENT IN AN ORGANIZED HEALTH CARE EDUCATION/TRAINING PROGRAM

## 2023-02-18 PROCEDURE — 93010 ELECTROCARDIOGRAM REPORT: CPT

## 2023-02-18 PROCEDURE — 74177 CT ABD & PELVIS W/CONTRAST: CPT | Performed by: STUDENT IN AN ORGANIZED HEALTH CARE EDUCATION/TRAINING PROGRAM

## 2023-02-18 PROCEDURE — 80048 BASIC METABOLIC PNL TOTAL CA: CPT | Performed by: STUDENT IN AN ORGANIZED HEALTH CARE EDUCATION/TRAINING PROGRAM

## 2023-02-18 PROCEDURE — 71046 X-RAY EXAM CHEST 2 VIEWS: CPT | Performed by: STUDENT IN AN ORGANIZED HEALTH CARE EDUCATION/TRAINING PROGRAM

## 2023-02-18 PROCEDURE — 96361 HYDRATE IV INFUSION ADD-ON: CPT

## 2023-02-18 PROCEDURE — 99285 EMERGENCY DEPT VISIT HI MDM: CPT

## 2023-02-18 PROCEDURE — 83690 ASSAY OF LIPASE: CPT | Performed by: STUDENT IN AN ORGANIZED HEALTH CARE EDUCATION/TRAINING PROGRAM

## 2023-02-18 PROCEDURE — 80076 HEPATIC FUNCTION PANEL: CPT | Performed by: STUDENT IN AN ORGANIZED HEALTH CARE EDUCATION/TRAINING PROGRAM

## 2023-02-18 PROCEDURE — 96374 THER/PROPH/DIAG INJ IV PUSH: CPT

## 2023-02-18 PROCEDURE — 93005 ELECTROCARDIOGRAM TRACING: CPT

## 2023-02-18 RX ORDER — ONDANSETRON 2 MG/ML
4 INJECTION INTRAMUSCULAR; INTRAVENOUS ONCE
Status: COMPLETED | OUTPATIENT
Start: 2023-02-18 | End: 2023-02-18

## 2023-02-19 VITALS
SYSTOLIC BLOOD PRESSURE: 138 MMHG | OXYGEN SATURATION: 90 % | RESPIRATION RATE: 17 BRPM | DIASTOLIC BLOOD PRESSURE: 82 MMHG | HEART RATE: 99 BPM | TEMPERATURE: 98 F

## 2023-02-19 LAB
ATRIAL RATE: 91 BPM
P AXIS: 7 DEGREES
P-R INTERVAL: 116 MS
Q-T INTERVAL: 352 MS
QRS DURATION: 78 MS
QTC CALCULATION (BEZET): 432 MS
R AXIS: 92 DEGREES
T AXIS: 35 DEGREES
VENTRICULAR RATE: 91 BPM

## 2023-02-19 NOTE — ED INITIAL ASSESSMENT (HPI)
Pt AOx4 C/C forceful vomiting with bright red blood and a syncopal episode afterward. States she hasn't been feeling well since she had Covid 1 month ago, describes a midsternal chest pain that radiates to the left.

## 2023-04-03 ENCOUNTER — APPOINTMENT (OUTPATIENT)
Dept: URBAN - METROPOLITAN AREA CLINIC 244 | Age: 28
Setting detail: DERMATOLOGY
End: 2023-04-06

## 2023-04-03 DIAGNOSIS — L71.8 OTHER ROSACEA: ICD-10-CM

## 2023-04-03 DIAGNOSIS — L21.8 OTHER SEBORRHEIC DERMATITIS: ICD-10-CM

## 2023-04-03 PROBLEM — L30.9 DERMATITIS, UNSPECIFIED: Status: ACTIVE | Noted: 2023-04-03

## 2023-04-03 PROCEDURE — OTHER COUNSELING: OTHER

## 2023-04-03 PROCEDURE — OTHER ORDER TESTS: OTHER

## 2023-04-03 PROCEDURE — OTHER PRESCRIPTION: OTHER

## 2023-04-03 PROCEDURE — 99214 OFFICE O/P EST MOD 30 MIN: CPT

## 2023-04-03 RX ORDER — KETOCONAZOLE 20 MG/ML
SHAMPOO, SUSPENSION TOPICAL
Qty: 240 | Refills: 11 | Status: ERX

## 2023-04-03 RX ORDER — SODIUM SULFACETAMIDE AND SULFUR 80; 40 MG/ML; MG/ML
LOTION TOPICAL
Qty: 473 | Refills: 5 | Status: ERX

## 2023-04-03 RX ORDER — SODIUM SULFACETAMIDE AND SULFUR 80; 40 MG/ML; MG/ML
LOTION TOPICAL
Qty: 473 | Refills: 5 | Status: ERX | COMMUNITY
Start: 2023-04-03

## 2023-04-03 RX ORDER — METRONIDAZOLE 7.5 MG/G
GEL TOPICAL
Qty: 135 | Refills: 2 | Status: ERX | COMMUNITY
Start: 2023-04-03

## 2023-04-03 RX ORDER — METRONIDAZOLE 7.5 MG/G
GEL TOPICAL
Qty: 135 | Refills: 2 | Status: ERX

## 2023-04-03 ASSESSMENT — LOCATION DETAILED DESCRIPTION DERM
LOCATION DETAILED: RIGHT CENTRAL MALAR CHEEK
LOCATION DETAILED: INFERIOR MID FOREHEAD
LOCATION DETAILED: LEFT INFERIOR CENTRAL MALAR CHEEK

## 2023-04-03 ASSESSMENT — LOCATION SIMPLE DESCRIPTION DERM
LOCATION SIMPLE: INFERIOR FOREHEAD
LOCATION SIMPLE: LEFT CHEEK
LOCATION SIMPLE: RIGHT CHEEK

## 2023-04-03 ASSESSMENT — LOCATION ZONE DERM: LOCATION ZONE: FACE

## 2023-11-17 ENCOUNTER — OFFICE VISIT (OUTPATIENT)
Dept: OBGYN CLINIC | Facility: CLINIC | Age: 28
End: 2023-11-17

## 2023-11-17 VITALS
SYSTOLIC BLOOD PRESSURE: 114 MMHG | DIASTOLIC BLOOD PRESSURE: 74 MMHG | HEIGHT: 62 IN | BODY MASS INDEX: 37.24 KG/M2 | WEIGHT: 202.38 LBS

## 2023-11-17 DIAGNOSIS — Z11.3 SCREENING EXAMINATION FOR STD (SEXUALLY TRANSMITTED DISEASE): ICD-10-CM

## 2023-11-17 DIAGNOSIS — N89.8 VAGINAL DISCHARGE: ICD-10-CM

## 2023-11-17 DIAGNOSIS — Z01.419 WELL WOMAN EXAM: Primary | ICD-10-CM

## 2023-11-17 PROCEDURE — 99395 PREV VISIT EST AGE 18-39: CPT | Performed by: OBSTETRICS & GYNECOLOGY

## 2023-11-17 PROCEDURE — 3008F BODY MASS INDEX DOCD: CPT | Performed by: OBSTETRICS & GYNECOLOGY

## 2023-11-17 PROCEDURE — 3074F SYST BP LT 130 MM HG: CPT | Performed by: OBSTETRICS & GYNECOLOGY

## 2023-11-17 PROCEDURE — 3078F DIAST BP <80 MM HG: CPT | Performed by: OBSTETRICS & GYNECOLOGY

## 2023-11-18 LAB
BV BACTERIA DNA VAG QL NAA+PROBE: POSITIVE
C GLABRATA DNA VAG QL NAA+PROBE: NEGATIVE
C KRUSEI DNA VAG QL NAA+PROBE: NEGATIVE
CANDIDA DNA VAG QL NAA+PROBE: NEGATIVE
T VAGINALIS DNA VAG QL NAA+PROBE: NEGATIVE

## 2023-11-20 ENCOUNTER — PATIENT MESSAGE (OUTPATIENT)
Dept: OBGYN CLINIC | Facility: CLINIC | Age: 28
End: 2023-11-20

## 2023-11-20 LAB
C TRACH DNA SPEC QL NAA+PROBE: NEGATIVE
N GONORRHOEA DNA SPEC QL NAA+PROBE: NEGATIVE

## 2023-11-20 RX ORDER — METRONIDAZOLE 500 MG/1
500 TABLET ORAL 2 TIMES DAILY
Qty: 14 TABLET | Refills: 0 | Status: SHIPPED | OUTPATIENT
Start: 2023-11-20 | End: 2023-11-27

## 2023-11-20 NOTE — TELEPHONE ENCOUNTER
From: Tim Jaramillo  To: Fernando ESCALANTE [address] [place] [address] Sw: 11/20/2023 12:53 PM CST  Subject: BV    Hello,     I received my test results and test positive for Bacterial Vaginosis. Is there medication or something I can use to help treat this? Thank you!

## 2023-11-20 NOTE — TELEPHONE ENCOUNTER
Name &  verified. Pt requesting treatment for BV, positive per  lab results. Pt prefers oral medication. Pt advised to avoid alcohol during treatment and call if symptoms don't resolve after treatment. VU. Prescription sent to pt preferred pharmacy.

## 2023-11-24 LAB — HPV I/H RISK 1 DNA SPEC QL NAA+PROBE: NEGATIVE

## 2023-12-02 ENCOUNTER — TELEPHONE (OUTPATIENT)
Dept: OBGYN CLINIC | Facility: CLINIC | Age: 28
End: 2023-12-02

## 2023-12-02 RX ORDER — METRONIDAZOLE 7.5 MG/G
1 GEL VAGINAL NIGHTLY
Qty: 1 EACH | Refills: 1 | Status: SHIPPED | OUTPATIENT
Start: 2023-12-02 | End: 2023-12-07

## 2025-02-21 ENCOUNTER — HOSPITAL ENCOUNTER (OUTPATIENT)
Age: 30
Discharge: HOME | End: 2025-02-21
Payer: COMMERCIAL

## 2025-02-21 VITALS
HEART RATE: 86 BPM | RESPIRATION RATE: 16 BRPM | TEMPERATURE: 98 F | DIASTOLIC BLOOD PRESSURE: 80 MMHG | SYSTOLIC BLOOD PRESSURE: 127 MMHG | OXYGEN SATURATION: 100 %

## 2025-02-21 DIAGNOSIS — R51.9 ACUTE NONINTRACTABLE HEADACHE, UNSPECIFIED HEADACHE TYPE: ICD-10-CM

## 2025-02-21 DIAGNOSIS — R10.12 LUQ ABDOMINAL PAIN: Primary | ICD-10-CM

## 2025-02-21 PROCEDURE — 99213 OFFICE O/P EST LOW 20 MIN: CPT

## 2025-02-21 RX ORDER — ACETAMINOPHEN 325 MG/1
650 TABLET ORAL ONCE
Status: COMPLETED | OUTPATIENT
Start: 2025-02-21 | End: 2025-02-21

## 2025-02-21 RX ORDER — IBUPROFEN 600 MG/1
600 TABLET, FILM COATED ORAL ONCE
Status: COMPLETED | OUTPATIENT
Start: 2025-02-21 | End: 2025-02-21

## 2025-02-21 NOTE — DISCHARGE INSTRUCTIONS
Call to set up an appointment with your primary care provider if you do not have 1 you may contact the resource provided to establish care discussed intermittent headaches that you have been having and the lump that you feel on the left upper abdomen.  You may also follow-up with a gastroenterologist.  Continue over-the-counter ibuprofen and Tylenol for the headache or you may try over-the-counter Excedrin as you could be having migraine headaches.  Make sure you are eating and drinking regularly monitor your bowel habits if you develop worsening abdominal pain something changes with the pain you develop back pain or urinary symptoms develop nausea or vomiting not able to have any bowel movements or diarrhea or fever or sudden onset headache or the worst headache of your life neck stiffness or fever or any concerns go to the nearest emergency department.
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
## 2025-04-24 ENCOUNTER — TELEPHONE (OUTPATIENT)
Dept: OBGYN CLINIC | Facility: CLINIC | Age: 30
End: 2025-04-24

## 2025-04-24 NOTE — TELEPHONE ENCOUNTER
Pt name and  verified. Pt calling to establish care.     Lmp: 3/17 5w3d  +hpt:   Cycles: regular    Pt scheduled for new OB on  with Dr. Mathews. Pt aware of scheduling details.

## 2025-05-02 ENCOUNTER — TELEPHONE (OUTPATIENT)
Dept: OBGYN CLINIC | Facility: CLINIC | Age: 30
End: 2025-05-02

## 2025-05-02 ENCOUNTER — INITIAL PRENATAL (OUTPATIENT)
Dept: OBGYN CLINIC | Facility: CLINIC | Age: 30
End: 2025-05-02

## 2025-05-02 VITALS — DIASTOLIC BLOOD PRESSURE: 74 MMHG | SYSTOLIC BLOOD PRESSURE: 127 MMHG | WEIGHT: 204.38 LBS | BODY MASS INDEX: 37 KG/M2

## 2025-05-02 DIAGNOSIS — Z34.81 ENCOUNTER FOR SUPERVISION OF OTHER NORMAL PREGNANCY IN FIRST TRIMESTER (HCC): Primary | ICD-10-CM

## 2025-05-02 PROCEDURE — 3074F SYST BP LT 130 MM HG: CPT | Performed by: OBSTETRICS & GYNECOLOGY

## 2025-05-02 PROCEDURE — 76801 OB US < 14 WKS SINGLE FETUS: CPT | Performed by: OBSTETRICS & GYNECOLOGY

## 2025-05-02 PROCEDURE — 3078F DIAST BP <80 MM HG: CPT | Performed by: OBSTETRICS & GYNECOLOGY

## 2025-05-02 RX ORDER — DOXYLAMINE SUCCINATE AND PYRIDOXINE HYDROCHLORIDE, DELAYED RELEASE TABLETS 10 MG/10 MG 10; 10 MG/1; MG/1
2 TABLET, DELAYED RELEASE ORAL NIGHTLY
Qty: 120 TABLET | Refills: 0 | Status: SHIPPED | OUTPATIENT
Start: 2025-05-02
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
## 2025-08-18 ENCOUNTER — APPOINTMENT (OUTPATIENT)
Dept: OTHER | Facility: HOSPITAL | Age: 30
End: 2025-08-18
Attending: EMERGENCY MEDICINE

## (undated) NOTE — LETTER
[address], [facility]   Date:   10/19/2022     Name:   Mildred James    YOB: 1995   MRN:   [id number]       WHERE IS YOUR PAIN NOW? Tio the areas on your body where you feel the described sensations. Use the appropriate symbol. Aparna Showers the areas of radiation. Include all affected areas. Just to complete the picture, please draw in the face. ACHE:  ^ ^ ^   NUMBNESS:  0000   PINS & NEEDLES:  = = = =                              ^ ^ ^                       0000              = = = =                                    ^ ^ ^                       0000            = = = =      BURNING:  XXXX   STABBING: ////                  XXXX                ////                         XXXX          ////     Please tio the line below indicating your degree of pain right now  with 0 being no pain 10 being the worst pain possible.                                          0             1             2              3             4              5              6              7             8             9             10         Patient Signature:

## (undated) NOTE — ED AVS SNAPSHOT
Yenny Chang   MRN: [id number]    Department:  [facility] Emergency Department   Date of Visit:  9/16/2019           Disclosure     Insurance plans vary and the physician(s) referred by the ER may not be covered by your plan.  Please contact yo CARE PHYSICIAN AT ONCE OR RETURN IMMEDIATELY TO THE EMERGENCY DEPARTMENT. If you have been prescribed any medication(s), please fill your prescription right away and begin taking the medication(s) as directed.   If you believe that any of the medications

## (undated) NOTE — LETTER
[address], [place]   Date:   9/7/2022     Name:   Morales Cortes    YOB: 1995   MRN:   [id number]       [facility]? Tio the areas on your body where you feel the described sensations. Use the appropriate symbol. Taz Reynaga the areas of radiation. Include all affected areas. Just to complete the picture, please draw in the face. ACHE:  ^ ^ ^   NUMBNESS:  0000   PINS & NEEDLES:  = = = =                              ^ ^ ^                       0000              = = = =                                    ^ ^ ^                       0000            = = = =      BURNING:  XXXX   STABBING: ////                  XXXX                ////                         XXXX          ////     Please tio the line below indicating your degree of pain right now  with 0 being no pain 10 being the worst pain possible.                                          0             1             2              3             4              5              6              7             8             9             10         Patient Signature:

## (undated) NOTE — LETTER
11/26/18        [address] [place] [address]      Dear Jesica Tanner,    Our records indicate that you have outstanding lab work and or testing that was ordered for you and has not yet been completed:  Orders Placed This Encounter      Physi

## (undated) NOTE — LETTER
Date & Time: 10/10/2020, 10:45 PM  Patient: Elina Mendez  Encounter Provider(s):    Chino Lockett MD       To Whom It May Concern:    Elina Mendez was seen and treated in our department on 10/10/2020. She should not return to work until 10/17/2020.

## (undated) NOTE — LETTER
Date & Time: 7/6/2018, 9:49 PM  Patient: Rebecca Post  Encounter Provider(s):    Tomy Hoang MD       To Whom It May Concern:    Rebecca Post was seen and treated in our department on 7/6/2018. She should not return to work until 7/10/18.     If y

## (undated) NOTE — ED AVS SNAPSHOT
Misha Staff   MRN: [id number]    Department:  [facility] Emergency Department   Date of Visit:  7/6/2018           Disclosure     Insurance plans vary and the physician(s) referred by the ER may not be covered by your plan.  Please contact you CARE PHYSICIAN AT ONCE OR RETURN IMMEDIATELY TO THE EMERGENCY DEPARTMENT. If you have been prescribed any medication(s), please fill your prescription right away and begin taking the medication(s) as directed.   If you believe that any of the medications

## (undated) NOTE — LETTER
02/26/19        [address] [place] [address]      Dear Alejandra Lilly,    Our records indicate that you have outstanding lab work and or testing that was ordered for you and has not yet been completed:  Orders Placed This Encounter      Physi

## (undated) NOTE — LETTER
Date & Time: 7/6/2018, 9:50 PM  Patient: Mannie Bills  Encounter Provider(s):    Aubrey Caro MD       To Whom It May Concern:    Mannie Bills was seen and treated in our department on 7/6/2018. She should not return to school until 7/10/2018.

## (undated) NOTE — LETTER
09/18/18        [address] [place] [address]      Dear Fab Orozco,    Our records indicate that you have outstanding lab work and or testing that was ordered for you and has not yet been completed:  Orders Placed This Encounter      Physi

## (undated) NOTE — LETTER
[address], [facility]   Date:   5/6/2022     Name:   Bill Chandra    YOB: 1995   MRN:   [id number]       WHERE IS YOUR PAIN NOW? Tio the areas on your body where you feel the described sensations. Use the appropriate symbol. Lolis Canal the areas of radiation. Include all affected areas. Just to complete the picture, please draw in the face. ACHE:  ^ ^ ^   NUMBNESS:  0000   PINS & NEEDLES:  = = = =                              ^ ^ ^                       0000              = = = =                                    ^ ^ ^                       0000            = = = =      BURNING:  XXXX   STABBING: ////                  XXXX                ////                         XXXX          ////     Please tio the line below indicating your degree of pain right now  with 0 being no pain 10 being the worst pain possible.                                          0             1             2              3             4              5              6              7             8             9             10         Patient Signature:

## (undated) NOTE — LETTER
To ensure the accuracy of condition updates after your procedure, Dr. Jaz Nelson would like for you to keep a written record of your pain for up to 12 hours after your injection on 10/6/22. When you provide office staff with your condition update post injection - please refer back to this document for ease of communication. Pain level prior to procedure: 1   2   3   4   5   6   7   8   9   10  (No Pain) 0  to  10 (Worst Pain); Please [place] corresponding number above. Pain level immediately following procedure: 1   2   3   4   5   6   7   8   9   10  (No Pain) 0  to  10 (Worst Pain); Please [place] corresponding number above. Please keep a close record of your pain for the next 12 hours to refer back to when speaking with office staff.       Percentage (%) of Relief:   0% - 100%  (0% = No Relief; 100% = Total Relief)   2 Hours After Procedure:     4 Hours After Procedure:     6 Hours After Procedure:     8 Hours After Procedure:     12 Hours After Procedure:

## (undated) NOTE — LETTER
[address], [place]   Date:   9/30/2022     Name:   Wilbur Palma    YOB: 1995   MRN:   [id number]       WHERE IS YOUR PAIN NOW? Tio the areas on your body where you feel the described sensations. Use the appropriate symbol. Portia Waldo the areas of radiation. Include all affected areas. Just to complete the picture, please draw in the face. ACHE:  ^ ^ ^   NUMBNESS:  0000   PINS & NEEDLES:  = = = =                              ^ ^ ^                       0000              = = = =                                    ^ ^ ^                       0000            = = = =      BURNING:  XXXX   STABBING: ////                  XXXX                ////                         XXXX          ////     Please tio the line below indicating your degree of pain right now  with 0 being no pain 10 being the worst pain possible.                                          0             1             2              3             4              5              6              7             8             9             10         Patient Signature: